# Patient Record
Sex: MALE | Race: WHITE | Employment: UNEMPLOYED | URBAN - METROPOLITAN AREA
[De-identification: names, ages, dates, MRNs, and addresses within clinical notes are randomized per-mention and may not be internally consistent; named-entity substitution may affect disease eponyms.]

---

## 2017-01-02 ENCOUNTER — HOSPITAL ENCOUNTER (EMERGENCY)
Age: 27
Discharge: HOME | End: 2017-01-02
Attending: EMERGENCY MEDICINE
Payer: MEDICAID

## 2017-01-02 VITALS
BODY MASS INDEX: 28.89 KG/M2 | HEIGHT: 67 IN | DIASTOLIC BLOOD PRESSURE: 64 MMHG | WEIGHT: 184.08 LBS | OXYGEN SATURATION: 100 % | SYSTOLIC BLOOD PRESSURE: 116 MMHG | HEART RATE: 74 BPM | RESPIRATION RATE: 16 BRPM | TEMPERATURE: 98.4 F

## 2017-01-02 DIAGNOSIS — Z72.0 TOBACCO ABUSE: ICD-10-CM

## 2017-01-02 DIAGNOSIS — R10.9 CHRONIC ABDOMINAL PAIN: ICD-10-CM

## 2017-01-02 DIAGNOSIS — R11.2 NAUSEA AND VOMITING, INTRACTABILITY OF VOMITING NOT SPECIFIED, UNSPECIFIED VOMITING TYPE: Primary | ICD-10-CM

## 2017-01-02 DIAGNOSIS — G89.29 CHRONIC ABDOMINAL PAIN: ICD-10-CM

## 2017-01-02 LAB
ALBUMIN SERPL BCP-MCNC: 3.9 G/DL (ref 3.5–5)
ALBUMIN/GLOB SERPL: 1 {RATIO} (ref 1.1–2.2)
ALP SERPL-CCNC: 77 U/L (ref 45–117)
ALT SERPL-CCNC: 45 U/L (ref 12–78)
AMPHET UR QL SCN: NEGATIVE
ANION GAP BLD CALC-SCNC: 7 MMOL/L (ref 5–15)
APPEARANCE UR: CLEAR
AST SERPL W P-5'-P-CCNC: 27 U/L (ref 15–37)
BARBITURATES UR QL SCN: NEGATIVE
BASOPHILS # BLD AUTO: 0 K/UL (ref 0–0.1)
BASOPHILS # BLD: 0 % (ref 0–1)
BENZODIAZ UR QL: POSITIVE
BILIRUB SERPL-MCNC: 0.2 MG/DL (ref 0.2–1)
BILIRUB UR QL: NEGATIVE
BUN SERPL-MCNC: 18 MG/DL (ref 6–20)
BUN/CREAT SERPL: 19 (ref 12–20)
CALCIUM SERPL-MCNC: 9 MG/DL (ref 8.5–10.1)
CANNABINOIDS UR QL SCN: POSITIVE
CHLORIDE SERPL-SCNC: 105 MMOL/L (ref 97–108)
CO2 SERPL-SCNC: 27 MMOL/L (ref 21–32)
COCAINE UR QL SCN: NEGATIVE
COLOR UR: NORMAL
CREAT SERPL-MCNC: 0.96 MG/DL (ref 0.7–1.3)
DRUG SCRN COMMENT,DRGCM: ABNORMAL
EOSINOPHIL # BLD: 0.2 K/UL (ref 0–0.4)
EOSINOPHIL NFR BLD: 3 % (ref 0–7)
ERYTHROCYTE [DISTWIDTH] IN BLOOD BY AUTOMATED COUNT: 12.8 % (ref 11.5–14.5)
GLOBULIN SER CALC-MCNC: 3.9 G/DL (ref 2–4)
GLUCOSE SERPL-MCNC: 102 MG/DL (ref 65–100)
GLUCOSE UR STRIP.AUTO-MCNC: NEGATIVE MG/DL
HCT VFR BLD AUTO: 42.9 % (ref 36.6–50.3)
HGB BLD-MCNC: 14.8 G/DL (ref 12.1–17)
HGB UR QL STRIP: NEGATIVE
KETONES UR QL STRIP.AUTO: NEGATIVE MG/DL
LEUKOCYTE ESTERASE UR QL STRIP.AUTO: NEGATIVE
LYMPHOCYTES # BLD AUTO: 39 % (ref 12–49)
LYMPHOCYTES # BLD: 2.3 K/UL (ref 0.8–3.5)
MCH RBC QN AUTO: 31.1 PG (ref 26–34)
MCHC RBC AUTO-ENTMCNC: 34.5 G/DL (ref 30–36.5)
MCV RBC AUTO: 90.1 FL (ref 80–99)
METHADONE UR QL: NEGATIVE
MONOCYTES # BLD: 0.6 K/UL (ref 0–1)
MONOCYTES NFR BLD AUTO: 9 % (ref 5–13)
NEUTS SEG # BLD: 3 K/UL (ref 1.8–8)
NEUTS SEG NFR BLD AUTO: 49 % (ref 32–75)
NITRITE UR QL STRIP.AUTO: NEGATIVE
OPIATES UR QL: NEGATIVE
PCP UR QL: NEGATIVE
PH UR STRIP: 6.5 [PH] (ref 5–8)
PLATELET # BLD AUTO: 184 K/UL (ref 150–400)
POTASSIUM SERPL-SCNC: 4.3 MMOL/L (ref 3.5–5.1)
PROT SERPL-MCNC: 7.8 G/DL (ref 6.4–8.2)
PROT UR STRIP-MCNC: NEGATIVE MG/DL
RBC # BLD AUTO: 4.76 M/UL (ref 4.1–5.7)
SODIUM SERPL-SCNC: 139 MMOL/L (ref 136–145)
SP GR UR REFRACTOMETRY: 1.01 (ref 1–1.03)
UROBILINOGEN UR QL STRIP.AUTO: 0.2 EU/DL (ref 0.2–1)
WBC # BLD AUTO: 6.1 K/UL (ref 4.1–11.1)

## 2017-01-02 PROCEDURE — 81003 URINALYSIS AUTO W/O SCOPE: CPT | Performed by: EMERGENCY MEDICINE

## 2017-01-02 PROCEDURE — 85025 COMPLETE CBC W/AUTO DIFF WBC: CPT | Performed by: EMERGENCY MEDICINE

## 2017-01-02 PROCEDURE — 36415 COLL VENOUS BLD VENIPUNCTURE: CPT | Performed by: EMERGENCY MEDICINE

## 2017-01-02 PROCEDURE — 80053 COMPREHEN METABOLIC PANEL: CPT | Performed by: EMERGENCY MEDICINE

## 2017-01-02 PROCEDURE — 80307 DRUG TEST PRSMV CHEM ANLYZR: CPT | Performed by: PHYSICIAN ASSISTANT

## 2017-01-02 PROCEDURE — 74011250637 HC RX REV CODE- 250/637: Performed by: PHYSICIAN ASSISTANT

## 2017-01-02 PROCEDURE — 99283 EMERGENCY DEPT VISIT LOW MDM: CPT

## 2017-01-02 RX ORDER — ONDANSETRON 4 MG/1
8 TABLET, ORALLY DISINTEGRATING ORAL
Status: COMPLETED | OUTPATIENT
Start: 2017-01-02 | End: 2017-01-02

## 2017-01-02 RX ORDER — OXYCODONE HYDROCHLORIDE 5 MG/1
5 TABLET ORAL
Status: COMPLETED | OUTPATIENT
Start: 2017-01-02 | End: 2017-01-02

## 2017-01-02 RX ORDER — PROMETHAZINE HYDROCHLORIDE 25 MG/1
25 TABLET ORAL
Qty: 20 TAB | Refills: 0 | Status: SHIPPED | OUTPATIENT
Start: 2017-01-02 | End: 2017-01-07

## 2017-01-02 RX ORDER — ONDANSETRON 4 MG/1
4 TABLET, ORALLY DISINTEGRATING ORAL
Qty: 20 TAB | Refills: 0 | Status: SHIPPED | OUTPATIENT
Start: 2017-01-02 | End: 2017-01-07

## 2017-01-02 RX ADMIN — ONDANSETRON 8 MG: 4 TABLET, ORALLY DISINTEGRATING ORAL at 13:59

## 2017-01-02 RX ADMIN — OXYCODONE HYDROCHLORIDE 5 MG: 5 TABLET ORAL at 16:13

## 2017-01-02 NOTE — ED PROVIDER NOTES
HPI Comments: Navarro Dillon is a 32 y.o. male with hx significant for GERD and asthma who presents ambulatory to the ED returning for the 7th time in the last 4 weeks for cc abdominal pain, nausea, and vomiting which started suddenly yesterday. Pt is uncertain if he has had a fever. Pt reports his last meal was yesterday which consisted of greasy foods. Pt reports that may be the cause of his abdominal pain. Pt reports several episodes of nonbilious/nonbloody emesis. PCP: Preston Hogan NP     Social Hx: + smoking (1ppd), - alcohol, + illicit drugs (Heroin, Marijuana)      There are no other complaints, changes, or physical findings at this time. The history is provided by the patient. Past Medical History:   Diagnosis Date    Asthma     Depression     Gastrointestinal disorder      abdominal pain    Gastroparesis     GERD (gastroesophageal reflux disease)     Heroin abuse     History of IBS      saw Dr. aMdelyn Padron Ill-defined condition      ADHD    Psychiatric disorder      Depression    Seizures [facility])      age 9 y/o - had sz x 2 - was seen by Dr. Ramirez Marshall - another sz age 15 y/o, another agoe 15 y/o       Past Surgical History:   Procedure Laterality Date    Hx orthopaedic       right arm fx.  Upper gi endoscopy,biopsy  6/2/2015          Hx other surgical  6/2/15     ESOPHAGOGASTRODUODENAL (EGD) BIOPSY         Family History:   Problem Relation Age of Onset    Anxiety Mother     Anxiety Father     Cancer Maternal Grandfather      bladder     Heart Disease Maternal Grandfather     Diabetes Maternal Grandfather        Social History     Social History    Marital status:      Spouse name: N/A    Number of children: N/A    Years of education: N/A     Occupational History    Not on file.      Social History Main Topics    Smoking status: Current Every Day Smoker     Packs/day: 1.00     Types: Cigarettes    Smokeless tobacco: Never Used    Alcohol use No      Comment: 6 months     Drug use: Yes     Special: Marijuana, Heroin      Comment: in partial remission    Sexual activity: Yes     Partners: Female     Birth control/ protection: None      Comment: patient has been at [facility]  / relapsed on  a few months ago/ patient was also on methadone      Other Topics Concern    Not on file     Social History Narrative         ALLERGIES: Amoxicillin; Ceclor [cefaclor]; Demerol [meperidine]; Levaquin [levofloxacin]; Lorazepam; Morphine; Tramadol; and Zoloft [sertraline]    Review of Systems   Constitutional: Negative for fatigue and fever. HENT: Negative for congestion, ear pain and rhinorrhea. Eyes: Negative for pain and redness. Respiratory: Negative for cough and wheezing. Cardiovascular: Negative for chest pain and palpitations. Gastrointestinal: Positive for abdominal pain, nausea and vomiting. Negative for blood in stool and diarrhea. Genitourinary: Negative for dysuria, frequency and urgency. Musculoskeletal: Negative for back pain, neck pain and neck stiffness. Skin: Negative for rash and wound. Neurological: Negative for weakness, light-headedness, numbness and headaches. Vitals:    01/02/17 1204   BP: 117/69   Pulse: 81   Resp: 18   Temp: 98.4 °F (36.9 °C)   SpO2: 100%   Weight: 83.5 kg (184 lb 1.4 oz)   Height: 5' 7\" (1.702 m)            Physical Exam   Constitutional: He is oriented to person, place, and time. He appears well-developed and well-nourished. No distress. HENT:   Head: Normocephalic and atraumatic. Right Ear: External ear normal.   Left Ear: External ear normal.   Eyes: Conjunctivae and EOM are normal. Pupils are equal, round, and reactive to light. Neck: Normal range of motion. Neck supple. Cardiovascular: Normal rate, regular rhythm and normal heart sounds. Pulmonary/Chest: Effort normal and breath sounds normal. No respiratory distress. Abdominal: Soft. Bowel sounds are normal. He exhibits no mass.  There is tenderness. There is no rebound and no guarding. Flat, Diffuse tenderness  Normal bowel sounds   Neurological: He is alert and oriented to person, place, and time. Skin: Skin is warm and dry. Psychiatric: He has a normal mood and affect. Nursing note and vitals reviewed. MDM  Number of Diagnoses or Management Options  Diagnosis management comments:     Afebrile; medical records reviewed;  reviewed; this is the 7th ED visit for this complaint since DEC01 2016. Previous imaging reviewed. Vital signs are normal today. Labs are normal and non diagnostic. UA is not positive for opioids. Tolerating liquids in the ED with no episode of emesis or diarrhea. Additional testing deferred. Given the number and frequency of narcotic prescriptions I am unable to prescribe additional narcotic pain medicine today. I will offer a single dose here in the ED. Amount and/or Complexity of Data Reviewed  Clinical lab tests: ordered and reviewed  Review and summarize past medical records: yes    Patient Progress  Patient progress: stable    ED Course       Procedures      TOBACCO COUNSELING:  Upon evaluation, pt expressed that they are a current tobacco user. Pt has been counseled on the dangers of smoking and was encouraged to quit as soon as possible in order to decrease further risks to their health. Pt has conveyed their understanding of the risks involved should they continue to use tobacco products.       LABORATORY TESTS:  Recent Results (from the past 12 hour(s))   CBC WITH AUTOMATED DIFF    Collection Time: 01/02/17 12:15 PM   Result Value Ref Range    WBC 6.1 4.1 - 11.1 K/uL    RBC 4.76 4.10 - 5.70 M/uL    HGB 14.8 12.1 - 17.0 g/dL    HCT 42.9 36.6 - 50.3 %    MCV 90.1 80.0 - 99.0 FL    MCH 31.1 26.0 - 34.0 PG    MCHC 34.5 30.0 - 36.5 g/dL    RDW 12.8 11.5 - 14.5 %    PLATELET 662 838 - 383 K/uL    NEUTROPHILS 49 32 - 75 %    LYMPHOCYTES 39 12 - 49 %    MONOCYTES 9 5 - 13 %    EOSINOPHILS 3 0 - 7 % BASOPHILS 0 0 - 1 %    ABS. NEUTROPHILS 3.0 1.8 - 8.0 K/UL    ABS. LYMPHOCYTES 2.3 0.8 - 3.5 K/UL    ABS. MONOCYTES 0.6 0.0 - 1.0 K/UL    ABS. EOSINOPHILS 0.2 0.0 - 0.4 K/UL    ABS. BASOPHILS 0.0 0.0 - 0.1 K/UL   METABOLIC PANEL, COMPREHENSIVE    Collection Time: 01/02/17 12:15 PM   Result Value Ref Range    Sodium 139 136 - 145 mmol/L    Potassium 4.3 3.5 - 5.1 mmol/L    Chloride 105 97 - 108 mmol/L    CO2 27 21 - 32 mmol/L    Anion gap 7 5 - 15 mmol/L    Glucose 102 (H) 65 - 100 mg/dL    BUN 18 6 - 20 MG/DL    Creatinine 0.96 0.70 - 1.30 MG/DL    BUN/Creatinine ratio 19 12 - 20      GFR est AA >60 >60 ml/min/1.73m2    GFR est non-AA >60 >60 ml/min/1.73m2    Calcium 9.0 8.5 - 10.1 MG/DL    Bilirubin, total 0.2 0.2 - 1.0 MG/DL    ALT 45 12 - 78 U/L    AST 27 15 - 37 U/L    Alk.  phosphatase 77 45 - 117 U/L    Protein, total 7.8 6.4 - 8.2 g/dL    Albumin 3.9 3.5 - 5.0 g/dL    Globulin 3.9 2.0 - 4.0 g/dL    A-G Ratio 1.0 (L) 1.1 - 2.2     URINALYSIS W/ RFLX MICROSCOPIC    Collection Time: 01/02/17  2:56 PM   Result Value Ref Range    Color YELLOW/STRAW      Appearance CLEAR CLEAR      Specific gravity 1.015 1.003 - 1.030      pH (UA) 6.5 5.0 - 8.0      Protein NEGATIVE  NEG mg/dL    Glucose NEGATIVE  NEG mg/dL    Ketone NEGATIVE  NEG mg/dL    Bilirubin NEGATIVE  NEG      Blood NEGATIVE  NEG      Urobilinogen 0.2 0.2 - 1.0 EU/dL    Nitrites NEGATIVE  NEG      Leukocyte Esterase NEGATIVE  NEG     DRUG SCREEN, URINE    Collection Time: 01/02/17  2:56 PM   Result Value Ref Range    AMPHETAMINE NEGATIVE  NEG      BARBITURATES NEGATIVE  NEG      BENZODIAZEPINE POSITIVE (A) NEG      COCAINE NEGATIVE  NEG      METHADONE NEGATIVE  NEG      OPIATES NEGATIVE  NEG      PCP(PHENCYCLIDINE) NEGATIVE  NEG      THC (TH-CANNABINOL) POSITIVE (A) NEG      Drug screen comment (NOTE)        MEDICATIONS GIVEN:  Medications   oxyCODONE IR (ROXICODONE) tablet 5 mg (not administered)   ondansetron (ZOFRAN ODT) tablet 8 mg (8 mg Oral Given 1/2/17 6888)       IMPRESSION:  1. Nausea and vomiting, intractability of vomiting not specified, unspecified vomiting type    2. Chronic abdominal pain    3. Tobacco abuse        PLAN:  1. Current Discharge Medication List      START taking these medications    Details   ! ! promethazine (PHENERGAN) 25 mg tablet Take 1 Tab by mouth every six (6) hours as needed for Nausea. Qty: 20 Tab, Refills: 0      !! ondansetron (ZOFRAN ODT) 4 mg disintegrating tablet Take 1 Tab by mouth every eight (8) hours as needed for Nausea. Qty: 20 Tab, Refills: 0       !! - Potential duplicate medications found. Please discuss with provider. CONTINUE these medications which have NOT CHANGED    Details   !! metoclopramide HCl (REGLAN) 10 mg tablet Take 1 Tab by mouth every six (6) hours as needed for Nausea for up to 10 days. Qty: 12 Tab, Refills: 0      HYDROcodone-acetaminophen (NORCO) 5-325 mg per tablet Take 1 Tab by mouth every four (4) hours as needed for Pain. Max Daily Amount: 6 Tabs. Qty: 12 Tab, Refills: 0      !! ondansetron (ZOFRAN ODT) 4 mg disintegrating tablet Take 1 Tab by mouth every eight (8) hours as needed for Nausea. Qty: 10 Tab, Refills: 0      diphenoxylate-atropine (LOMOTIL) 2.5-0.025 mg per tablet Take 1 Tab by mouth four (4) times daily as needed for Diarrhea (1 tab after each stool for max 8 per day). Max Daily Amount: 4 Tabs. Take after each stool for a maximum of 8 tablets daily  Qty: 20 Tab, Refills: 0      oxyCODONE IR (ROXICODONE) 5 mg immediate release tablet Take 1 Tab by mouth every six (6) hours as needed for Pain (breakthrough pain). Max Daily Amount: 20 mg. Indications: causes drowsiness;do not drink,drive, or use machinery while taking; take with stool softener  Qty: 12 Tab, Refills: 0      polyethylene glycol (MIRALAX) 17 gram/dose powder Take 17 g by mouth daily.  1 tablespoon with 8 oz of water daily  Qty: 510 g, Refills: 0      clonazePAM (KLONOPIN) 1 mg tablet Take 1 Tab by mouth two (2) times a day. Max Daily Amount: 2 mg. Qty: 20 Tab, Refills: 0      promethazine (PHENERGAN) 25 mg suppository Insert 1 Suppository into rectum every six (6) hours as needed for Nausea for up to 25 doses. Qty: 25 Suppository, Refills: 0      !! metoclopramide HCl (REGLAN) 10 mg tablet Take 1 Tab by mouth every six (6) hours as needed. Qty: 20 Tab, Refills: 0      !! promethazine (PHENERGAN) 25 mg tablet Take 1 Tab by mouth every six (6) hours as needed. Qty: 12 Tab, Refills: 0      docusate sodium 100 mg tab Take 1 Tab by mouth two (2) times a day. Indications: Constipation  Qty: 20 Tab, Refills: 0      PARoxetine CR (PAXIL-CR) 25 mg tablet Take 1 Tab by mouth daily. Qty: 30 Tab, Refills: 1    Associated Diagnoses: Depression with anxiety       ! ! - Potential duplicate medications found. Please discuss with provider. 2.   Follow-up Information     Follow up With Details Comments Contact Info    Jeri Briones NP Schedule an appointment as soon as possible for a visit PRIMARY CARE: call to schedule follow up [address] [facility]  [address] [facility]      Ev Arias MD Schedule an appointment as soon as possible for a visit GASTROENTEROLOGY: call to schedule follow up [address] [place] [address] Rd  [phone]          Return to ED if worse     DISCHARGE NOTE  4:07 PM  The patient has been re-evaluated and is ready for discharge. Reviewed available results with patient. Counseled pt on diagnosis and care plan. Pt has expressed understanding, and all questions have been answered. Pt agrees with plan and agrees to F/U as recommended, or return to the ED if their sxs worsen. Discharge instructions have been provided and explained to the pt, along with reasons to return to the ED. This note is prepared by Carlos Oden and Kimi acting as Scribes for Sahra Berg. ASHLEY Francisco:  The scribes' documentation has been prepared under my direction and personally reviewed by me in its entirety. I confirm that the note above accurately reflects all work, treatment, procedures, and medical decision making performed by me.

## 2017-01-02 NOTE — DISCHARGE INSTRUCTIONS
Thank you for allowing us to provide you with care today. We hope we addressed all of your concerns and needs. We strive to provide excellent quality care in the Emergency Department. Please rate us as excellent, as anything less than excellent does not meet our expectations. If you feel that you have not received excellent quality care or timely care, please ask to speak to the nurse manager. Please choose us in the future for your continued health care needs. The exam and treatment you received in the Emergency Department were for an urgent problem and are not intended as complete care. It is important that you follow-up with a doctor, nurse practitioner, or  [id number] assistant to: (1) confirm your diagnosis, (2) re-evaluation of changes in your illness and treatment, and (3) for ongoing care. If your symptoms become worse or you do not improve as expected and you are unable to reach your usual health care provider, you should return to the Emergency Department. We are available 24 hours a day. Take this sheet with you when you go to your follow-up visit. If you have any problem arranging the follow-up visit, contact the Emergency Department immediately. Make an appointment with your Primary Care doctor for follow up of this visit. Return to the ER if you are unable to be seen in the time recommended on your discharge instructions.

## 2017-01-02 NOTE — ED NOTES
Patient reports n/v for the past 1 day.  Patient reports this feeling similar to previous episodes of gastroparesis

## 2017-01-02 NOTE — ED NOTES
Discharge instructions given to patient by Radha MCDONOUGH. Patient verbalized understanding of discharge instructions. Pt discharged without difficulty. Pt. Discharged in stable condition via ambulation , accompanied by wife.

## 2017-01-06 ENCOUNTER — HOSPITAL ENCOUNTER (EMERGENCY)
Age: 27
Discharge: HOME | End: 2017-01-06
Attending: EMERGENCY MEDICINE | Admitting: EMERGENCY MEDICINE
Payer: MEDICAID

## 2017-01-06 VITALS
WEIGHT: 170 LBS | OXYGEN SATURATION: 100 % | HEART RATE: 83 BPM | RESPIRATION RATE: 17 BRPM | BODY MASS INDEX: 26.68 KG/M2 | SYSTOLIC BLOOD PRESSURE: 120 MMHG | DIASTOLIC BLOOD PRESSURE: 53 MMHG | HEIGHT: 67 IN | TEMPERATURE: 98.8 F

## 2017-01-06 DIAGNOSIS — R56.9 SEIZURE (HCC): Primary | ICD-10-CM

## 2017-01-06 LAB
ALBUMIN SERPL BCP-MCNC: 4 G/DL (ref 3.5–5)
ALBUMIN/GLOB SERPL: 1.1 {RATIO} (ref 1.1–2.2)
ALP SERPL-CCNC: 72 U/L (ref 45–117)
ALT SERPL-CCNC: 30 U/L (ref 12–78)
AMPHET UR QL SCN: NEGATIVE
ANION GAP BLD CALC-SCNC: 8 MMOL/L (ref 5–15)
APPEARANCE UR: CLEAR
AST SERPL W P-5'-P-CCNC: 16 U/L (ref 15–37)
BACTERIA URNS QL MICRO: NEGATIVE /HPF
BARBITURATES UR QL SCN: NEGATIVE
BASOPHILS # BLD AUTO: 0 K/UL (ref 0–0.1)
BASOPHILS # BLD: 0 % (ref 0–1)
BENZODIAZ UR QL: NEGATIVE
BILIRUB SERPL-MCNC: 0.3 MG/DL (ref 0.2–1)
BILIRUB UR QL: NEGATIVE
BUN SERPL-MCNC: 20 MG/DL (ref 6–20)
BUN/CREAT SERPL: 21 (ref 12–20)
CALCIUM SERPL-MCNC: 9 MG/DL (ref 8.5–10.1)
CANNABINOIDS UR QL SCN: POSITIVE
CHLORIDE SERPL-SCNC: 105 MMOL/L (ref 97–108)
CO2 SERPL-SCNC: 27 MMOL/L (ref 21–32)
COCAINE UR QL SCN: NEGATIVE
COLOR UR: ABNORMAL
CREAT SERPL-MCNC: 0.94 MG/DL (ref 0.7–1.3)
DRUG SCRN COMMENT,DRGCM: ABNORMAL
EOSINOPHIL # BLD: 0.1 K/UL (ref 0–0.4)
EOSINOPHIL NFR BLD: 1 % (ref 0–7)
EPITH CASTS URNS QL MICRO: ABNORMAL /LPF
ERYTHROCYTE [DISTWIDTH] IN BLOOD BY AUTOMATED COUNT: 12.6 % (ref 11.5–14.5)
GLOBULIN SER CALC-MCNC: 3.6 G/DL (ref 2–4)
GLUCOSE SERPL-MCNC: 92 MG/DL (ref 65–100)
GLUCOSE UR STRIP.AUTO-MCNC: NEGATIVE MG/DL
HCT VFR BLD AUTO: 41.6 % (ref 36.6–50.3)
HGB BLD-MCNC: 14.7 G/DL (ref 12.1–17)
HGB UR QL STRIP: NEGATIVE
KETONES UR QL STRIP.AUTO: NEGATIVE MG/DL
LEUKOCYTE ESTERASE UR QL STRIP.AUTO: ABNORMAL
LYMPHOCYTES # BLD AUTO: 28 % (ref 12–49)
LYMPHOCYTES # BLD: 2.4 K/UL (ref 0.8–3.5)
MCH RBC QN AUTO: 30.7 PG (ref 26–34)
MCHC RBC AUTO-ENTMCNC: 35.3 G/DL (ref 30–36.5)
MCV RBC AUTO: 86.8 FL (ref 80–99)
METHADONE UR QL: NEGATIVE
MONOCYTES # BLD: 0.5 K/UL (ref 0–1)
MONOCYTES NFR BLD AUTO: 6 % (ref 5–13)
MUCOUS THREADS URNS QL MICRO: ABNORMAL /LPF
NEUTS SEG # BLD: 5.4 K/UL (ref 1.8–8)
NEUTS SEG NFR BLD AUTO: 65 % (ref 32–75)
NITRITE UR QL STRIP.AUTO: NEGATIVE
OPIATES UR QL: NEGATIVE
PCP UR QL: NEGATIVE
PH UR STRIP: 5 [PH] (ref 5–8)
PLATELET # BLD AUTO: 211 K/UL (ref 150–400)
POTASSIUM SERPL-SCNC: 4.4 MMOL/L (ref 3.5–5.1)
PROT SERPL-MCNC: 7.6 G/DL (ref 6.4–8.2)
PROT UR STRIP-MCNC: NEGATIVE MG/DL
RBC # BLD AUTO: 4.79 M/UL (ref 4.1–5.7)
RBC #/AREA URNS HPF: ABNORMAL /HPF (ref 0–5)
SODIUM SERPL-SCNC: 140 MMOL/L (ref 136–145)
SP GR UR REFRACTOMETRY: 1.02 (ref 1–1.03)
UA: UC IF INDICATED,UAUC: ABNORMAL
UROBILINOGEN UR QL STRIP.AUTO: 0.2 EU/DL (ref 0.2–1)
WBC # BLD AUTO: 8.4 K/UL (ref 4.1–11.1)
WBC URNS QL MICRO: ABNORMAL /HPF (ref 0–4)

## 2017-01-06 PROCEDURE — 81001 URINALYSIS AUTO W/SCOPE: CPT | Performed by: EMERGENCY MEDICINE

## 2017-01-06 PROCEDURE — 36415 COLL VENOUS BLD VENIPUNCTURE: CPT | Performed by: EMERGENCY MEDICINE

## 2017-01-06 PROCEDURE — 96374 THER/PROPH/DIAG INJ IV PUSH: CPT

## 2017-01-06 PROCEDURE — 74011250637 HC RX REV CODE- 250/637: Performed by: EMERGENCY MEDICINE

## 2017-01-06 PROCEDURE — 74011250636 HC RX REV CODE- 250/636: Performed by: EMERGENCY MEDICINE

## 2017-01-06 PROCEDURE — 80053 COMPREHEN METABOLIC PANEL: CPT | Performed by: EMERGENCY MEDICINE

## 2017-01-06 PROCEDURE — 80307 DRUG TEST PRSMV CHEM ANLYZR: CPT | Performed by: EMERGENCY MEDICINE

## 2017-01-06 PROCEDURE — 85025 COMPLETE CBC W/AUTO DIFF WBC: CPT | Performed by: EMERGENCY MEDICINE

## 2017-01-06 PROCEDURE — 99285 EMERGENCY DEPT VISIT HI MDM: CPT

## 2017-01-06 RX ORDER — LEVETIRACETAM 500 MG/1
500 TABLET ORAL
Status: COMPLETED | OUTPATIENT
Start: 2017-01-06 | End: 2017-01-06

## 2017-01-06 RX ORDER — LEVETIRACETAM 500 MG/1
500 TABLET ORAL 2 TIMES DAILY
Qty: 60 TAB | Refills: 0 | Status: SHIPPED | OUTPATIENT
Start: 2017-01-06 | End: 2017-03-30

## 2017-01-06 RX ORDER — BUTALBITAL, ACETAMINOPHEN AND CAFFEINE 50; 325; 40 MG/1; MG/1; MG/1
2 TABLET ORAL
Status: COMPLETED | OUTPATIENT
Start: 2017-01-06 | End: 2017-01-06

## 2017-01-06 RX ORDER — BUTALBITAL, ACETAMINOPHEN AND CAFFEINE 300; 40; 50 MG/1; MG/1; MG/1
2 CAPSULE ORAL
Qty: 20 CAP | Refills: 0 | Status: SHIPPED | OUTPATIENT
Start: 2017-01-06 | End: 2017-03-30

## 2017-01-06 RX ORDER — MIDAZOLAM HYDROCHLORIDE 1 MG/ML
5 INJECTION, SOLUTION INTRAMUSCULAR; INTRAVENOUS
Status: COMPLETED | OUTPATIENT
Start: 2017-01-06 | End: 2017-01-06

## 2017-01-06 RX ADMIN — BUTALBITAL, ACETAMINOPHEN AND CAFFEINE 2 TABLET: 50; 325; 40 TABLET ORAL at 14:06

## 2017-01-06 RX ADMIN — MIDAZOLAM HYDROCHLORIDE 5 MG: 1 INJECTION, SOLUTION INTRAMUSCULAR; INTRAVENOUS at 14:06

## 2017-01-06 RX ADMIN — LEVETIRACETAM 500 MG: 500 TABLET ORAL at 15:15

## 2017-01-06 NOTE — ED NOTES
Discharge instructions provided to patient by PA/MD. VSS. Patient refuses wheelchair and ambulatroy to discharge with steady gait.

## 2017-01-06 NOTE — ED PROVIDER NOTES
HPI Comments: Abel Esparza is a 32 y.o. male with PMHx significant for asthma, depression, seizures, GERD, Gastroparesis, and Heroin abuse presenting via EMS to [id number] [place] [address] ED with c/o an 8/10 headache and tongue pain s/p suffering a seizure just prior to arrival in the ED. The pt reports that he was walking in [place] with his wife when all of a sudden he had a seizure for the first time in 10 years. He notes that he woke up from his seizure surrounded by EMS with a sharp headache and pain in his tongue from biting it. He states that he takes [place] everyday but notes that he had not taken any yet today. He reports that he used to take Depakote for his seizures as well but states that he has not taken any in a while. The pt's mother states that the pt has also been experiencing n/v for the past couple of weeks. He specifically denies any fevers, chills, nausea, vomiting, chest pain, shortness of breath, rash, diarrhea, sweating or weight loss. PCP: Juni Chacon NP  Social History:  (+) Tobacco (1 ppd),   (-) EtOH,      (+) Drugs     There are no other complaints, changes, or physical findings at this time. The history is provided by the patient and a parent. Past Medical History:   Diagnosis Date    Asthma     Depression     Gastrointestinal disorder      abdominal pain    Gastroparesis     GERD (gastroesophageal reflux disease)     Heroin abuse     History of IBS      saw Dr. Key Armstrong Ill-defined condition      ADHD    Psychiatric disorder      Depression    Seizures [facility])      age 9 y/o - had sz x 2 - was seen by Dr. Opal Klein - another sz age 15 y/o, another agoe 15 y/o       Past Surgical History:   Procedure Laterality Date    Hx orthopaedic       right arm fx.     Upper gi endoscopy,biopsy  6/2/2015          Hx other surgical  6/2/15     ESOPHAGOGASTRODUODENAL (EGD) BIOPSY         Family History:   Problem Relation Age of Onset    Anxiety Mother     Anxiety Father    [address] [facility] Cancer Maternal Grandfather      bladder     Heart Disease Maternal Grandfather     Diabetes Maternal Grandfather        Social History     Social History    Marital status:      Spouse name: N/A    Number of children: N/A    Years of education: N/A     Occupational History    Not on file. Social History Main Topics    Smoking status: Current Every Day Smoker     Packs/day: 1.00     Types: Cigarettes    Smokeless tobacco: Never Used    Alcohol use No      Comment: 6 months     Drug use: Yes     Special: Marijuana, Heroin      Comment: in partial remission    Sexual activity: Yes     Partners: Female     Birth control/ protection: None      Comment: patient has been at [facility]  / relapsed on  a few months ago/ patient was also on methadone      Other Topics Concern    Not on file     Social History Narrative         ALLERGIES: Amoxicillin; Ceclor [cefaclor]; Demerol [meperidine]; Levaquin [levofloxacin]; Lorazepam; Morphine; Tramadol; and Zoloft [sertraline]    Review of Systems   Constitutional: Negative. Negative for activity change, appetite change, chills, fatigue, fever and unexpected weight change. HENT: Negative for congestion, hearing loss, rhinorrhea, sneezing and voice change. Positive for tongue pain   Eyes: Negative. Negative for pain and visual disturbance. Respiratory: Negative. Negative for apnea, cough, choking, chest tightness and shortness of breath. Cardiovascular: Negative. Negative for chest pain and palpitations. Gastrointestinal: Negative for abdominal distention, abdominal pain, blood in stool and diarrhea. Genitourinary: Negative. Negative for difficulty urinating, flank pain, frequency and urgency. No discharge   Musculoskeletal: Negative. Negative for arthralgias, back pain, myalgias and neck stiffness. Skin: Negative. Negative for color change and rash. Neurological: Positive for seizures and headaches.  Negative for dizziness, syncope, speech difficulty, weakness and numbness. Hematological: Negative for adenopathy. Psychiatric/Behavioral: Negative. Negative for agitation, behavioral problems, dysphoric mood and suicidal ideas. The patient is not nervous/anxious. All other systems reviewed and are negative. Vitals:    01/06/17 1345 01/06/17 1430 01/06/17 1515 01/06/17 1530   BP: 126/78 115/64 112/61 120/53   Pulse: 88 89 83    Resp: 20 18 17    Temp:       SpO2: 98% 96% 97% 100%   Weight:       Height:                Physical Exam   Constitutional: He is oriented to person, place, and time. He appears well-developed and well-nourished. No distress. HENT:   Head: Normocephalic and atraumatic. Mouth/Throat: Oropharynx is clear and moist. No oropharyngeal exudate. Abrasion to right lateral tongue      Eyes: Conjunctivae and EOM are normal. Pupils are equal, round, and reactive to light. Right eye exhibits no discharge. Left eye exhibits no discharge. Neck: Normal range of motion. Neck supple. Cardiovascular: Normal rate, regular rhythm and intact distal pulses. Exam reveals no gallop and no friction rub. No murmur heard. Pulmonary/Chest: Effort normal and breath sounds normal. No respiratory distress. He has no wheezes. He has no rales. He exhibits no tenderness. Abdominal: Soft. Bowel sounds are normal. He exhibits no distension and no mass. There is no tenderness. There is no rebound and no guarding. Musculoskeletal: Normal range of motion. He exhibits no edema. Lymphadenopathy:     He has no cervical adenopathy. Neurological: He is alert and oriented to person, place, and time. No cranial nerve deficit. Coordination normal.   Skin: Skin is warm and dry. No rash noted. No erythema. Psychiatric: He has a normal mood and affect. Nursing note and vitals reviewed.        MDM  Number of Diagnoses or Management Options  Seizure [facility]):   Diagnosis management comments:   DDx: sub therapeutic AED, Benzodiazepine withdrawal        Amount and/or Complexity of Data Reviewed  Clinical lab tests: ordered and reviewed  Obtain history from someone other than the patient: yes (Pt's mother   )  Review and summarize past medical records: yes    Patient Progress  Patient progress: stable    ED Course       Procedures    Chief Complaint   Patient presents with    Seizure     Appeared tonic clonic according to bystanders. Lac to tongue. hx of seizures. Not taking medications       3:28 PM  The patients presenting problems have been discussed, and they are in agreement with the care plan formulated and outlined with them. I have encouraged them to ask questions as they arise throughout their visit. MEDICATIONS GIVEN:  Medications   midazolam (VERSED) injection 5 mg (5 mg IntraVENous Given 1/6/17 1406)   butalbital-acetaminophen-caffeine (FIORICET, ESGIC) -40 mg per tablet 2 Tab (2 Tabs Oral Given 1/6/17 1406)   levETIRAcetam (KEPPRA) tablet 500 mg (500 mg Oral Given 1/6/17 1515)       LABS REVIEWED:  Recent Results (from the past 24 hour(s))   CBC WITH AUTOMATED DIFF    Collection Time: 01/06/17  1:58 PM   Result Value Ref Range    WBC 8.4 4.1 - 11.1 K/uL    RBC 4.79 4.10 - 5.70 M/uL    HGB 14.7 12.1 - 17.0 g/dL    HCT 41.6 36.6 - 50.3 %    MCV 86.8 80.0 - 99.0 FL    MCH 30.7 26.0 - 34.0 PG    MCHC 35.3 30.0 - 36.5 g/dL    RDW 12.6 11.5 - 14.5 %    PLATELET 679 490 - 516 K/uL    NEUTROPHILS 65 32 - 75 %    LYMPHOCYTES 28 12 - 49 %    MONOCYTES 6 5 - 13 %    EOSINOPHILS 1 0 - 7 %    BASOPHILS 0 0 - 1 %    ABS. NEUTROPHILS 5.4 1.8 - 8.0 K/UL    ABS. LYMPHOCYTES 2.4 0.8 - 3.5 K/UL    ABS. MONOCYTES 0.5 0.0 - 1.0 K/UL    ABS. EOSINOPHILS 0.1 0.0 - 0.4 K/UL    ABS.  BASOPHILS 0.0 0.0 - 0.1 K/UL   METABOLIC PANEL, COMPREHENSIVE    Collection Time: 01/06/17  1:58 PM   Result Value Ref Range    Sodium 140 136 - 145 mmol/L    Potassium 4.4 3.5 - 5.1 mmol/L    Chloride 105 97 - 108 mmol/L    CO2 27 21 - 32 mmol/L    Anion gap 8 5 - 15 mmol/L    Glucose 92 65 - 100 mg/dL    BUN 20 6 - 20 MG/DL    Creatinine 0.94 0.70 - 1.30 MG/DL    BUN/Creatinine ratio 21 (H) 12 - 20      GFR est AA >60 >60 ml/min/1.73m2    GFR est non-AA >60 >60 ml/min/1.73m2    Calcium 9.0 8.5 - 10.1 MG/DL    Bilirubin, total 0.3 0.2 - 1.0 MG/DL    ALT 30 12 - 78 U/L    AST 16 15 - 37 U/L    Alk.  phosphatase 72 45 - 117 U/L    Protein, total 7.6 6.4 - 8.2 g/dL    Albumin 4.0 3.5 - 5.0 g/dL    Globulin 3.6 2.0 - 4.0 g/dL    A-G Ratio 1.1 1.1 - 2.2     URINALYSIS W/ REFLEX CULTURE    Collection Time: 01/06/17  1:58 PM   Result Value Ref Range    Color YELLOW/STRAW      Appearance CLEAR CLEAR      Specific gravity 1.018 1.003 - 1.030      pH (UA) 5.0 5.0 - 8.0      Protein NEGATIVE  NEG mg/dL    Glucose NEGATIVE  NEG mg/dL    Ketone NEGATIVE  NEG mg/dL    Bilirubin NEGATIVE  NEG      Blood NEGATIVE  NEG      Urobilinogen 0.2 0.2 - 1.0 EU/dL    Nitrites NEGATIVE  NEG      Leukocyte Esterase SMALL (A) NEG      WBC 0-4 0 - 4 /hpf    RBC 0-5 0 - 5 /hpf    Epithelial cells FEW FEW /lpf    Bacteria NEGATIVE  NEG /hpf    UA:UC IF INDICATED CULTURE NOT INDICATED BY UA RESULT CNI      Mucus TRACE (A) NEG /lpf   DRUG SCREEN, URINE    Collection Time: 01/06/17  1:58 PM   Result Value Ref Range    AMPHETAMINE NEGATIVE  NEG      BARBITURATES NEGATIVE  NEG      BENZODIAZEPINE NEGATIVE  NEG      COCAINE NEGATIVE  NEG      METHADONE NEGATIVE  NEG      OPIATES NEGATIVE  NEG      PCP(PHENCYCLIDINE) NEGATIVE  NEG      THC (TH-CANNABINOL) POSITIVE (A) NEG      Drug screen comment (NOTE)        VITAL SIGNS:  Patient Vitals for the past 12 hrs:   Temp Pulse Resp BP SpO2   01/06/17 1530 - - - 120/53 100 %   01/06/17 1515 - 83 17 112/61 97 %   01/06/17 1430 - 89 18 115/64 96 %   01/06/17 1345 - 88 20 126/78 98 %   01/06/17 1344 98.8 °F (37.1 °C) 87 18 127/83 97 %       RADIOLOGY RESULTS:  The following have been ordered and reviewed:  No orders to display PROCEDURES:        CONSULTATIONS:       PROGRESS NOTES:  2:29 PM  Discussed cessation of tobacco and marijuana with the pt.     3:29 PM   The pt has been updated on his results and given follow up information for Dr. Chidi Mueller    DIAGNOSIS:    1. Seizure [facility])        PLAN:  Follow-up Information     Follow up With Details Comments [address] [facility], NP Call in 2 days  [address], [address]  [phone]          Current Discharge Medication List      START taking these medications    Details   levETIRAcetam (KEPPRA) 500 mg tablet Take 1 Tab by mouth two (2) times a day. Qty: 60 Tab, Refills: 0      butalbital-acetaminophen-caff (FIORICET) -40 mg per capsule Take 2 Caps by mouth every four (4) hours as needed for Pain. Max Daily Amount: 12 Caps. Qty: 20 Cap, Refills: 0         CONTINUE these medications which have NOT CHANGED    Details   ! ! promethazine (PHENERGAN) 25 mg tablet Take 1 Tab by mouth every six (6) hours as needed for Nausea. Qty: 20 Tab, Refills: 0      !! ondansetron (ZOFRAN ODT) 4 mg disintegrating tablet Take 1 Tab by mouth every eight (8) hours as needed for Nausea. Qty: 20 Tab, Refills: 0      !! metoclopramide HCl (REGLAN) 10 mg tablet Take 1 Tab by mouth every six (6) hours as needed for Nausea for up to 10 days. Qty: 12 Tab, Refills: 0      HYDROcodone-acetaminophen (NORCO) 5-325 mg per tablet Take 1 Tab by mouth every four (4) hours as needed for Pain. Max Daily Amount: 6 Tabs. Qty: 12 Tab, Refills: 0      !! ondansetron (ZOFRAN ODT) 4 mg disintegrating tablet Take 1 Tab by mouth every eight (8) hours as needed for Nausea. Qty: 10 Tab, Refills: 0      diphenoxylate-atropine (LOMOTIL) 2.5-0.025 mg per tablet Take 1 Tab by mouth four (4) times daily as needed for Diarrhea (1 tab after each stool for max 8 per day). Max Daily Amount: 4 Tabs.  Take after each stool for a maximum of 8 tablets daily  Qty: 20 Tab, Refills: 0      oxyCODONE IR (ROXICODONE) 5 mg immediate release tablet Take 1 Tab by mouth every six (6) hours as needed for Pain (breakthrough pain). Max Daily Amount: 20 mg. Indications: causes drowsiness;do not drink,drive, or use machinery while taking; take with stool softener  Qty: 12 Tab, Refills: 0      polyethylene glycol (MIRALAX) 17 gram/dose powder Take 17 g by mouth daily. 1 tablespoon with 8 oz of water daily  Qty: 510 g, Refills: 0      clonazePAM (KLONOPIN) 1 mg tablet Take 1 Tab by mouth two (2) times a day. Max Daily Amount: 2 mg. Qty: 20 Tab, Refills: 0      promethazine (PHENERGAN) 25 mg suppository Insert 1 Suppository into rectum every six (6) hours as needed for Nausea for up to 25 doses. Qty: 25 Suppository, Refills: 0      !! metoclopramide HCl (REGLAN) 10 mg tablet Take 1 Tab by mouth every six (6) hours as needed. Qty: 20 Tab, Refills: 0      !! promethazine (PHENERGAN) 25 mg tablet Take 1 Tab by mouth every six (6) hours as needed. Qty: 12 Tab, Refills: 0      docusate sodium 100 mg tab Take 1 Tab by mouth two (2) times a day. Indications: Constipation  Qty: 20 Tab, Refills: 0      PARoxetine CR (PAXIL-CR) 25 mg tablet Take 1 Tab by mouth daily. Qty: 30 Tab, Refills: 1    Associated Diagnoses: Depression with anxiety       ! ! - Potential duplicate medications found. Please discuss with provider. ED COURSE: The patients hospital course has been uncomplicated. 03:31 PM  Kobi Barrera Dougherty's  results have been reviewed with him. He has been counseled regarding his diagnosis. He verbally conveys understanding and agreement of the signs, symptoms, diagnosis, treatment and prognosis and additionally agrees to follow up as recommended with Dr. Yaritza Blanco, [place] in 24 - 48 hours. He also agrees with the care-plan and conveys that all of his questions have been answered.   I have also put together some discharge instructions for him that include: 1) educational information regarding their diagnosis, 2) how to care for their diagnosis at home, as well a 3) list of reasons why they would want to return to the ED prior to their follow-up appointment, should their condition change. This note is prepared by Ankit Cage, acting as Scribe for [employer] Dino Zimmer, [facility] Dino Zimmer MD: The scribe's documentation has been prepared under my direction and personally reviewed by me in its entirety. I confirm that the note above accurately reflects all work, treatment, procedures, and medical decision making performed by me.

## 2017-01-06 NOTE — DISCHARGE INSTRUCTIONS

## 2017-01-06 NOTE — ED NOTES
Assumed care of patient. Patient is alert and oriented, does not appear to be in distress. Patient ambulatory to ED with c/o witnessed seizure PTA while at the [facility]. Patient has hx of seizures but does not take medications for it. Patient admits to occasional marijuana use. Monitor x 3 applied. Rails padded. Lac to mouth present. Per EMS patient did not have post ictal state. Patient positioned for comfort with call bell within reach. Side rails up for safety. Provider to evaluate patient.

## 2017-01-07 ENCOUNTER — HOSPITAL ENCOUNTER (EMERGENCY)
Age: 27
Discharge: STILL ADMITTED OR ADMINISTRATIVE | End: 2017-01-07
Attending: EMERGENCY MEDICINE
Payer: MEDICAID

## 2017-01-07 ENCOUNTER — APPOINTMENT (OUTPATIENT)
Dept: GENERAL RADIOLOGY | Age: 27
End: 2017-01-07
Attending: EMERGENCY MEDICINE
Payer: MEDICAID

## 2017-01-07 ENCOUNTER — APPOINTMENT (OUTPATIENT)
Dept: CT IMAGING | Age: 27
End: 2017-01-07
Attending: EMERGENCY MEDICINE
Payer: MEDICAID

## 2017-01-07 ENCOUNTER — HOSPITAL ENCOUNTER (EMERGENCY)
Age: 27
Discharge: HOME | End: 2017-01-07
Attending: EMERGENCY MEDICINE
Payer: MEDICAID

## 2017-01-07 VITALS
DIASTOLIC BLOOD PRESSURE: 62 MMHG | OXYGEN SATURATION: 100 % | WEIGHT: 165 LBS | HEIGHT: 67 IN | RESPIRATION RATE: 14 BRPM | BODY MASS INDEX: 25.9 KG/M2 | HEART RATE: 95 BPM | SYSTOLIC BLOOD PRESSURE: 114 MMHG | TEMPERATURE: 98.2 F

## 2017-01-07 DIAGNOSIS — S00.83XA FACIAL CONTUSION, INITIAL ENCOUNTER: Primary | ICD-10-CM

## 2017-01-07 DIAGNOSIS — Z76.5 DRUG-SEEKING BEHAVIOR: ICD-10-CM

## 2017-01-07 DIAGNOSIS — Z76.5 MALINGERING: ICD-10-CM

## 2017-01-07 LAB
AMPHET UR QL SCN: NEGATIVE
APPEARANCE UR: CLEAR
BACTERIA URNS QL MICRO: NEGATIVE /HPF
BARBITURATES UR QL SCN: POSITIVE
BENZODIAZ UR QL: POSITIVE
BILIRUB UR QL: NEGATIVE
CANNABINOIDS UR QL SCN: POSITIVE
COCAINE UR QL SCN: NEGATIVE
COLOR UR: ABNORMAL
DRUG SCRN COMMENT,DRGCM: ABNORMAL
EPITH CASTS URNS QL MICRO: ABNORMAL /LPF
GLUCOSE UR STRIP.AUTO-MCNC: NEGATIVE MG/DL
HGB UR QL STRIP: NEGATIVE
KETONES UR QL STRIP.AUTO: NEGATIVE MG/DL
LEUKOCYTE ESTERASE UR QL STRIP.AUTO: ABNORMAL
METHADONE UR QL: NEGATIVE
NITRITE UR QL STRIP.AUTO: NEGATIVE
OPIATES UR QL: NEGATIVE
PCP UR QL: NEGATIVE
PH UR STRIP: 6 [PH] (ref 5–8)
PROT UR STRIP-MCNC: NEGATIVE MG/DL
RBC #/AREA URNS HPF: ABNORMAL /HPF (ref 0–5)
SP GR UR REFRACTOMETRY: 1.02 (ref 1–1.03)
UROBILINOGEN UR QL STRIP.AUTO: 0.2 EU/DL (ref 0.2–1)
WBC URNS QL MICRO: ABNORMAL /HPF (ref 0–4)

## 2017-01-07 PROCEDURE — 72125 CT NECK SPINE W/O DYE: CPT

## 2017-01-07 PROCEDURE — 75810000275 HC EMERGENCY DEPT VISIT NO LEVEL OF CARE

## 2017-01-07 PROCEDURE — 71101 X-RAY EXAM UNILAT RIBS/CHEST: CPT

## 2017-01-07 PROCEDURE — 73060 X-RAY EXAM OF HUMERUS: CPT

## 2017-01-07 PROCEDURE — 80307 DRUG TEST PRSMV CHEM ANLYZR: CPT | Performed by: EMERGENCY MEDICINE

## 2017-01-07 PROCEDURE — 74011250636 HC RX REV CODE- 250/636: Performed by: EMERGENCY MEDICINE

## 2017-01-07 PROCEDURE — 81001 URINALYSIS AUTO W/SCOPE: CPT | Performed by: EMERGENCY MEDICINE

## 2017-01-07 PROCEDURE — 70450 CT HEAD/BRAIN W/O DYE: CPT

## 2017-01-07 PROCEDURE — 96372 THER/PROPH/DIAG INJ SC/IM: CPT

## 2017-01-07 PROCEDURE — 99284 EMERGENCY DEPT VISIT MOD MDM: CPT

## 2017-01-07 RX ORDER — KETOROLAC TROMETHAMINE 30 MG/ML
30 INJECTION, SOLUTION INTRAMUSCULAR; INTRAVENOUS
Status: DISCONTINUED | OUTPATIENT
Start: 2017-01-07 | End: 2017-01-07

## 2017-01-07 RX ORDER — LORAZEPAM 1 MG/1
1 TABLET ORAL
COMMUNITY
End: 2017-03-30

## 2017-01-07 RX ORDER — KETOROLAC TROMETHAMINE 30 MG/ML
60 INJECTION, SOLUTION INTRAMUSCULAR; INTRAVENOUS
Status: COMPLETED | OUTPATIENT
Start: 2017-01-07 | End: 2017-01-07

## 2017-01-07 RX ADMIN — KETOROLAC TROMETHAMINE 60 MG: 30 INJECTION, SOLUTION INTRAMUSCULAR at 17:27

## 2017-01-07 NOTE — ED NOTES
When urine sample was sent, pt asked her if he had a black eye. Mother back at the bedside and made aware of suspicion.

## 2017-01-07 NOTE — ED NOTES
Pt given soda pop to encourage need to void for specimen. Mother stepped away from room for a moment, pt resting on stretcher however remains to be fidgety. Pt appears more calm post IM injection for pain.

## 2017-01-07 NOTE — ED NOTES
Pt very agitated and pulled all leads to monitor off,scooted the bottom of the stretcher and got himself fully dressed, pt stated that since we were not treating his pain properly that he was leaving, pt is alert to self,place,time and situation,ambulatory independently. [place] 57 escorted patient to the waiting room. PIV discontinued.

## 2017-01-07 NOTE — ED NOTES
As I was getting ready to medicate patient, patient now stating that he is Allergic to T[place]al and that it makes him have hives. Pt stating/yelling that he wants something a lot stronger, \"there ain't nothing wrong with my stomach,I want something stronger! Tell the doctor I want to see him. \" Dr Olivia Alfonso contacted.

## 2017-01-07 NOTE — ED NOTES
Pt up and ambulating in the room, pt up to void, very small amount obtained and sent to lab. Pt asking for more pain medication,MD made aware. Ice pack provided for patient due to increased swelling to the left cheek bone.

## 2017-01-07 NOTE — ED NOTES
Pt's mother at the stretcher side to transport to CT and X-ray, pt remains to be belligerent and stating that the Tordal will not help his pain and that he wants something stronger. As time passes, pt with a small goose egg to the top of his left cheek bone. However, pt remains to be very impulsive as well and moving impulsively on the stretcher. Pt made aware that we need a urine sample. Water provided.

## 2017-01-07 NOTE — ED TRIAGE NOTES
Assumed patient care from EMS, [place] Justin. Pt with even,unlabored respirations, placed in a position of comfort, connected to the monitor x 3, call bell in reach, pillow and blanket for comfort. Assessment to follow. Pt here today for increased lethargy after being started on new medication yesterday after experiencing a seizure, which he had not had in the last 10 years. Pt also states that he fell down the stairs today and hit the left side of his head along with pain to the left knee. Pt did not arrive via backboard or c-collar, however is moving everything freely without any signs of pain. Dr Puneet Pardo at the bedside to evaluate. Per EMS, pt was not cooperative at the home and upon initial standing, was stumbling and had to be grabbed quickly to make sure he did not fall. Per Spouse at the home, patient has not been drinking nor drugs per spouse. Call bell in reach. Pt very fidgety and impulsive, door open to room for safety precautions.

## 2017-01-07 NOTE — ED NOTES
Pt's mother stepped outside for a moment, mother still remains to state that he is not his normal self. Pt fully dressed once again and stating that he wants to go out side and smoke. Pt talked into walking back into the room. Lights turned off.

## 2017-01-07 NOTE — ED PROVIDER NOTES
HPI Comments: Khanh Logan is a 32 y.o. male with a history of epilepsy, asthma, depression, gastroparesis, and heroin abuse who presents via EMS to [facility] ED with a chief complaint of left cheek, left knee, left arm, and left lateral chest wall pain secondary to a fall down his steps today due to increased lethargy and confusion following an unwitnessed break-through seizure yesterday for which he was evaluated here. Per medical records, patient was discharged with a new prescription for Keppra yesterday, which patient reports he took as prescribed today. Patient states he was previously on Depakote, but notes he had not been taking it for \"a while\". Per medical records, patient's last seizure prior to yesterday was over 10 years ago. Patient denies use of any tobacco products, alcohol, or illicit drugs. Patient denies any neck pain or any other symptoms at this time. PCP: Yaritza lBanco NP    There are no other complaints, changes or physical findings at this time. The history is provided by the patient and medical records. Past Medical History:   Diagnosis Date    Asthma     Depression     Gastrointestinal disorder      abdominal pain    Gastroparesis     GERD (gastroesophageal reflux disease)     Heroin abuse     History of IBS      saw Dr. Kassandra Reed Ill-defined condition      ADHD    Psychiatric disorder      Depression    Seizures [facility])      age 9 y/o - had sz x 2 - was seen by Dr. Rubia Joseph - another sz age 15 y/o, another agoe 15 y/o       Past Surgical History:   Procedure Laterality Date    Hx orthopaedic       right arm fx.     Upper gi endoscopy,biopsy  6/2/2015          Hx other surgical  6/2/15     ESOPHAGOGASTRODUODENAL (EGD) BIOPSY         Family History:   Problem Relation Age of Onset    Anxiety Mother     Anxiety Father     Cancer Maternal Grandfather      bladder     Heart Disease Maternal Grandfather     Diabetes Maternal Grandfather        Social History Social History    Marital status:      Spouse name: N/A    Number of children: N/A    Years of education: N/A     Occupational History    Not on file. Social History Main Topics    Smoking status: Current Every Day Smoker     Packs/day: 1.00     Types: Cigarettes    Smokeless tobacco: Never Used    Alcohol use No      Comment: 6 months     Drug use: Yes     Special: Marijuana, Heroin      Comment: in partial remission    Sexual activity: Yes     Partners: Female     Birth control/ protection: None      Comment: patient has been at [facility]  / relapsed on  a few months ago/ patient was also on methadone      Other Topics Concern    Not on file     Social History Narrative     ALLERGIES: Amoxicillin; Ceclor [cefaclor]; Demerol [meperidine]; Levaquin [levofloxacin]; Lorazepam; Morphine; Tramadol; and Zoloft [sertraline]    Review of Systems   Constitutional: Negative. Negative for appetite change, chills and fever. HENT: Negative for congestion. Positive for left cheek pain   Eyes: Negative. Negative for visual disturbance. Respiratory: Negative. Negative for cough, shortness of breath and wheezing. Cardiovascular: Negative. Negative for chest pain, palpitations and leg swelling. Gastrointestinal: Negative. Negative for abdominal pain. Endocrine: Negative. Genitourinary: Negative. Negative for dysuria, frequency and urgency. Musculoskeletal: Positive for arthralgias (left knee) and myalgias (left arm). Negative for back pain, joint swelling, neck pain and neck stiffness. Positive for left lateral chest wall pain   Skin: Negative. Negative for rash. Allergic/Immunologic: Negative. Neurological: Negative. Negative for dizziness, syncope, weakness and headaches. Hematological: Negative. Negative for adenopathy. Psychiatric/Behavioral: Negative. Negative for behavioral problems and dysphoric mood.    All other systems reviewed and are negative. Patient Vitals for the past 12 hrs:   Temp Pulse Resp BP SpO2   01/07/17 1630 - 98 15 118/69 100 %   01/07/17 1551 98.2 °F (36.8 °C) 90 16 102/75 100 %      Physical Exam   Constitutional: He is oriented to person, place, and time. He appears well-developed and well-nourished. No distress. HENT:   Head: Normocephalic and atraumatic. Mouth/Throat: Oropharynx is clear and moist.   No obvious trauma to the head   Eyes: Conjunctivae and EOM are normal. Pupils are equal, round, and reactive to light. No scleral icterus. Neck: Normal range of motion. Neck supple. C-spine non-tender, full range of motion   Cardiovascular: Normal rate and regular rhythm. Exam reveals no gallop. No murmur heard. Pulmonary/Chest: Effort normal. No stridor. No respiratory distress. He has no wheezes. He has no rales. Left chest wall tender to palpation laterally   Abdominal: Soft. Bowel sounds are normal. He exhibits no distension and no mass. There is no tenderness. There is no rebound and no guarding. Musculoskeletal: Normal range of motion. He exhibits no edema. Tenderness midshaft of the left humerus  Left leg normal, full range of motion   Lymphadenopathy:     He has no cervical adenopathy. Neurological: He is alert and oriented to person, place, and time. No cranial nerve deficit. Coordination normal.   Slow to respond to questions  Confused, agitated   Skin: Skin is warm and dry. No rash noted. No erythema. Psychiatric: He has a normal mood and affect. Nursing note and vitals reviewed.        MDM  Number of Diagnoses or Management Options  Diagnosis management comments: DDx: Sprain, strain, contusion, medication effect, illicit drug use       Amount and/or Complexity of Data Reviewed  Tests in the radiology section of CPT®: ordered and reviewed  Obtain history from someone other than the patient: yes (Medical records)  Review and summarize past medical records: yes    Patient Progress  Patient progress: stable      Procedures     4:09 PM  Reviewed pt's chart; he has a long H/O chronic pain and CVS, as well as narcotic abuse (including heroin). He is continuously asking for pain medication, despite a lack of objective findings suggestive of injury. Pt refusing Toradol. He is very angry that I will not give him Dilaudid or Ativan. When I made it clear that I will not treat him with these agents, he began to tear off his monitors. I suspect he will leave prior to receiving his imaging studies, despite my warning that he might have an undiagnosed injury. He remains adamant that he will leave if I don't give him Dilaudid or Ativan. Mac Guy MD     4:20 PM  Pt eloped. Refused AMA paperwork. Mac Guy MD    4:44 PM  Late Notation: Pt walked out of room to Triage and signed back in, saying he wanted a different MD.  Mac Guy MD     5:15 PM  Chart reactivated; I will remain the physician of record. Imaging to be reordered. Mac Guy MD    6:28 PM  Negative imaging of head, c-spine, humerus, and ribs. No evidence of trauma. Will discharge home, should continue on Keppra until seen by Neurology. Mac Guy MD    7:03 PM  Pt has a large contusion on his face. Staff is suspicious that it could be self-inflicted as a means of obtaining narcotics. CT of head showed no acute injury and no hematoma in the location where there now is one, and none was noted on pt's arrival. Pt is now very agitated because I will not give him a narcotic prescription. Will discharge home. Mac Guy MD      PLAN:  1. Discharge Medication List as of 1/7/2017  4:31 PM      CONTINUE these medications which have NOT CHANGED    Details   LORazepam (ATIVAN) 1 mg tablet Take 1 mg by mouth every four (4) hours as needed for Anxiety. , Historical Med      levETIRAcetam (KEPPRA) 500 mg tablet Take 1 Tab by mouth two (2) times a day., Normal, Disp-60 Tab, R-0      butalbital-acetaminophen-caff (FIORICET) -40 mg per capsule Take 2 Caps by mouth every four (4) hours as needed for Pain. Max Daily Amount: 12 Caps., Print, Disp-20 Cap, R-0      ondansetron (ZOFRAN ODT) 4 mg disintegrating tablet Take 1 Tab by mouth every eight (8) hours as needed for Nausea., Normal, Disp-10 Tab, R-0      polyethylene glycol (MIRALAX) 17 gram/dose powder Take 17 g by mouth daily. 1 tablespoon with 8 oz of water daily, Normal, Disp-510 g, R-0      metoclopramide HCl (REGLAN) 10 mg tablet Take 1 Tab by mouth every six (6) hours as needed., Normal, Disp-20 Tab, R-0      HYDROcodone-acetaminophen (NORCO) 5-325 mg per tablet Take 1 Tab by mouth every four (4) hours as needed for Pain. Max Daily Amount: 6 Tabs., Print, Disp-12 Tab, R-0      diphenoxylate-atropine (LOMOTIL) 2.5-0.025 mg per tablet Take 1 Tab by mouth four (4) times daily as needed for Diarrhea (1 tab after each stool for max 8 per day). Max Daily Amount: 4 Tabs. Take after each stool for a maximum of 8 tablets daily, Print, Disp-20 Tab, R-0      oxyCODONE IR (ROXICODONE) 5 mg immediate release tablet Take 1 Tab by mouth every six (6) hours as needed for Pain (breakthrough pain). Max Daily Amount: 20 mg. Indications: causes drowsiness;do not drink,drive, or use machinery while taking; take with stool softener, Print, Disp-12 Tab, R-0      clonazePAM (KLONOPIN) 1 mg tablet Take 1 Tab by mouth two (2) times a day. Max Daily Amount: 2 mg., Print, Disp-20 Tab, R-0      promethazine (PHENERGAN) 25 mg suppository Insert 1 Suppository into rectum every six (6) hours as needed for Nausea for up to 25 doses. , Normal, Disp-25 Suppository, R-0      promethazine (PHENERGAN) 25 mg tablet Take 1 Tab by mouth every six (6) hours as needed. , Print, Disp-12 Tab, R-0      docusate sodium 100 mg tab Take 1 Tab by mouth two (2) times a day. Indications: Constipation, Normal, Disp-20 Tab, R-0      PARoxetine CR (PAXIL-CR) 25 mg tablet Take 1 Tab by mouth daily. , Normal, Disp-30 Tab, R-1           Return to ED if worse

## 2017-01-07 NOTE — ED NOTES
Pt with blanket pulled up over his head, curtain open to room, noted that the patient is moving arms around head in an odd manor. Questioning whether or not patient is hitting himself in the face. When asked, pt denies hitting himself. MD made aware.

## 2017-01-08 NOTE — ED NOTES
Dr Yana Dozier at the bedside, pt's mother stating that she is concerned that the pt is acting differently. Pt taking Fioricet/ Lorazepam and the new seizure medication can explain why the patient is slightly unsteady and moving sporadically in the room.

## 2017-01-08 NOTE — ED NOTES
Pt up and yelling, stating that he wants something else for his pain. Dr Jose Angel Riddle approached prior to his mother returning to the room. Pt on the telephone to his mother, whom is outside. Mother returning to the room to speak to the doctor.

## 2017-01-08 NOTE — ED NOTES
Dr Carroll Casey has reviewed discharge instructions with the patient and parent. The patient and parent verbalized understanding. Pt with steady gait, Discharge papers in hand. Pt's mother at his side.

## 2017-01-08 NOTE — ED NOTES
Mother back at the bedside, and pt and her yelling back and forth. Pt will listen slightly when mother is around, but as soon as the mother leaves pt goes back to yelling and demanding narcotics.

## 2017-03-30 ENCOUNTER — HOSPITAL ENCOUNTER (EMERGENCY)
Age: 27
Discharge: HOME | End: 2017-03-30
Attending: FAMILY MEDICINE

## 2017-03-30 VITALS
OXYGEN SATURATION: 97 % | HEIGHT: 67 IN | SYSTOLIC BLOOD PRESSURE: 109 MMHG | WEIGHT: 173 LBS | BODY MASS INDEX: 27.15 KG/M2 | DIASTOLIC BLOOD PRESSURE: 56 MMHG | RESPIRATION RATE: 18 BRPM | HEART RATE: 80 BPM | TEMPERATURE: 97.7 F

## 2017-03-30 DIAGNOSIS — K59.03 DRUG-INDUCED CONSTIPATION: Primary | ICD-10-CM

## 2017-03-30 RX ORDER — BUPRENORPHINE AND NALOXONE 8; 2 MG/1; MG/1
FILM, SOLUBLE BUCCAL; SUBLINGUAL DAILY
COMMUNITY
End: 2018-05-31

## 2017-03-30 NOTE — UC PROVIDER NOTE
Patient is a 32 y.o. male presenting with constipation. The history is provided by the patient. Constipation    This is a new problem. Episode onset: 1month ago. The stool is described as hard. Associated symptoms include constipation. Pertinent negatives include no chills and no fever. Risk factors include a change in medication usage/dosage. He has tried oral laxatives for the symptoms. The treatment provided mild relief. His past medical history does not include dementia, neuromuscular disease, irritable bowel syndrome, neurologic disease, small bowel obstruction or abdominal surgery. Past Medical History:   Diagnosis Date    Asthma     Depression     Gastrointestinal disorder     abdominal pain    Gastroparesis     GERD (gastroesophageal reflux disease)     Heroin abuse     History of IBS     saw Dr. Vinh Archuleta Ill-defined condition     ADHD    Psychiatric disorder     Depression    Seizures [facility])     age 7 y/o - had sz x 2 - was seen by Dr. Dixie Lynn - another sz age 15 y/o, another agoe 15 y/o        Past Surgical History:   Procedure Laterality Date    HX ORTHOPAEDIC      right arm fx.  HX OTHER SURGICAL  6/2/15    ESOPHAGOGASTRODUODENAL (EGD) BIOPSY    UPPER GI ENDOSCOPY,BIOPSY  6/2/2015              Family History   Problem Relation Age of Onset    Anxiety Mother     Anxiety Father     Cancer Maternal Grandfather      bladder     Heart Disease Maternal Grandfather     Diabetes Maternal Grandfather         Social History     Social History    Marital status:      Spouse name: N/A    Number of children: N/A    Years of education: N/A     Occupational History    Not on file.      Social History Main Topics    Smoking status: Current Every Day Smoker     Packs/day: 1.00     Types: Cigarettes    Smokeless tobacco: Never Used    Alcohol use No      Comment: 6 months     Drug use: Yes     Special: Marijuana, Heroin      Comment: in partial remission    Sexual activity: Yes Partners: Female     Birth control/ protection: None      Comment: patient has been at [facility]  / relapsed on  a few months ago/ patient was also on methadone      Other Topics Concern    Not on file     Social History Narrative                ALLERGIES: Amoxicillin; Ceclor [cefaclor]; Demerol [meperidine]; Levaquin [levofloxacin]; Lorazepam; Morphine; Tramadol; and Zoloft [sertraline]    Review of Systems   Constitutional: Negative for chills and fever. Gastrointestinal: Positive for constipation. Vitals:    03/30/17 1602 03/30/17 1604   BP:  109/56   Pulse:  80   Resp:  18   Temp:  97.7 °F (36.5 °C)   SpO2:  97%   Weight: 78.5 kg (173 lb)    Height: 5' 7\" (1.702 m)        Physical Exam   Constitutional: He is oriented to person, place, and time. He appears well-developed and well-nourished. Pulmonary/Chest: Effort normal.   Abdominal: Soft. Bowel sounds are normal. He exhibits no distension. There is no tenderness. There is no rebound and no guarding. Neurological: He is alert and oriented to person, place, and time. Skin: Skin is warm and dry. Psychiatric: He has a normal mood and affect. His behavior is normal. Thought content normal.   Nursing note and vitals reviewed. MDM     Differential Diagnosis; Clinical Impression; Plan:     CLINICAL IMPRESSION:  Drug-induced constipation  (primary encounter diagnosis)    Plan:  1. linzess UAD  2.   3.   Risk of Significant Complications, Morbidity, and/or Mortality:   Presenting problems: Moderate  Diagnostic procedures: Moderate  Management options:   Moderate  Progress:   Patient progress:  Stable      Procedures

## 2018-01-20 ENCOUNTER — HOSPITAL ENCOUNTER (EMERGENCY)
Age: 28
Discharge: HOME | End: 2018-01-20
Attending: EMERGENCY MEDICINE
Payer: MEDICAID

## 2018-01-20 VITALS
BODY MASS INDEX: 26.44 KG/M2 | TEMPERATURE: 98 F | DIASTOLIC BLOOD PRESSURE: 61 MMHG | WEIGHT: 168.43 LBS | RESPIRATION RATE: 16 BRPM | OXYGEN SATURATION: 97 % | HEART RATE: 90 BPM | HEIGHT: 67 IN | SYSTOLIC BLOOD PRESSURE: 97 MMHG

## 2018-01-20 DIAGNOSIS — R11.2 NON-INTRACTABLE VOMITING WITH NAUSEA, UNSPECIFIED VOMITING TYPE: Primary | ICD-10-CM

## 2018-01-20 LAB
ALBUMIN SERPL-MCNC: 4.1 G/DL (ref 3.5–5)
ALBUMIN/GLOB SERPL: 1.1 {RATIO} (ref 1.1–2.2)
ALP SERPL-CCNC: 91 U/L (ref 45–117)
ALT SERPL-CCNC: 20 U/L (ref 12–78)
ANION GAP SERPL CALC-SCNC: 10 MMOL/L (ref 5–15)
APPEARANCE UR: CLEAR
AST SERPL-CCNC: 17 U/L (ref 15–37)
BACTERIA URNS QL MICRO: NEGATIVE /HPF
BASOPHILS # BLD: 0 K/UL (ref 0–0.1)
BASOPHILS NFR BLD: 0 % (ref 0–1)
BILIRUB SERPL-MCNC: 0.6 MG/DL (ref 0.2–1)
BILIRUB UR QL CFM: NEGATIVE
BUN SERPL-MCNC: 18 MG/DL (ref 6–20)
BUN/CREAT SERPL: 21 (ref 12–20)
CALCIUM SERPL-MCNC: 9.5 MG/DL (ref 8.5–10.1)
CHLORIDE SERPL-SCNC: 106 MMOL/L (ref 97–108)
CO2 SERPL-SCNC: 23 MMOL/L (ref 21–32)
COLOR UR: ABNORMAL
CREAT SERPL-MCNC: 0.84 MG/DL (ref 0.7–1.3)
EOSINOPHIL # BLD: 0.1 K/UL (ref 0–0.4)
EOSINOPHIL NFR BLD: 2 % (ref 0–7)
EPITH CASTS URNS QL MICRO: ABNORMAL /LPF
ERYTHROCYTE [DISTWIDTH] IN BLOOD BY AUTOMATED COUNT: 12.4 % (ref 11.5–14.5)
GLOBULIN SER CALC-MCNC: 3.6 G/DL (ref 2–4)
GLUCOSE SERPL-MCNC: 93 MG/DL (ref 65–100)
GLUCOSE UR STRIP.AUTO-MCNC: NEGATIVE MG/DL
HCT VFR BLD AUTO: 40.8 % (ref 36.6–50.3)
HGB BLD-MCNC: 14.3 G/DL (ref 12.1–17)
HGB UR QL STRIP: NEGATIVE
HYALINE CASTS URNS QL MICRO: ABNORMAL /LPF (ref 0–5)
KETONES UR QL STRIP.AUTO: 15 MG/DL
LEUKOCYTE ESTERASE UR QL STRIP.AUTO: NEGATIVE
LIPASE SERPL-CCNC: 63 U/L (ref 73–393)
LYMPHOCYTES # BLD: 1.9 K/UL (ref 0.8–3.5)
LYMPHOCYTES NFR BLD: 32 % (ref 12–49)
MCH RBC QN AUTO: 30.4 PG (ref 26–34)
MCHC RBC AUTO-ENTMCNC: 35 G/DL (ref 30–36.5)
MCV RBC AUTO: 86.6 FL (ref 80–99)
MONOCYTES # BLD: 0.5 K/UL (ref 0–1)
MONOCYTES NFR BLD: 8 % (ref 5–13)
NEUTS SEG # BLD: 3.4 K/UL (ref 1.8–8)
NEUTS SEG NFR BLD: 58 % (ref 32–75)
NITRITE UR QL STRIP.AUTO: NEGATIVE
PH UR STRIP: 5.5 [PH] (ref 5–8)
PLATELET # BLD AUTO: 222 K/UL (ref 150–400)
POTASSIUM SERPL-SCNC: 3.8 MMOL/L (ref 3.5–5.1)
PROT SERPL-MCNC: 7.7 G/DL (ref 6.4–8.2)
PROT UR STRIP-MCNC: NEGATIVE MG/DL
RBC # BLD AUTO: 4.71 M/UL (ref 4.1–5.7)
RBC #/AREA URNS HPF: ABNORMAL /HPF (ref 0–5)
SODIUM SERPL-SCNC: 139 MMOL/L (ref 136–145)
SP GR UR REFRACTOMETRY: 1.02 (ref 1–1.03)
UA: UC IF INDICATED,UAUC: ABNORMAL
UROBILINOGEN UR QL STRIP.AUTO: 0.2 EU/DL (ref 0.2–1)
WBC # BLD AUTO: 5.9 K/UL (ref 4.1–11.1)
WBC URNS QL MICRO: ABNORMAL /HPF (ref 0–4)

## 2018-01-20 PROCEDURE — 96374 THER/PROPH/DIAG INJ IV PUSH: CPT

## 2018-01-20 PROCEDURE — 85025 COMPLETE CBC W/AUTO DIFF WBC: CPT | Performed by: EMERGENCY MEDICINE

## 2018-01-20 PROCEDURE — 36415 COLL VENOUS BLD VENIPUNCTURE: CPT | Performed by: EMERGENCY MEDICINE

## 2018-01-20 PROCEDURE — 96361 HYDRATE IV INFUSION ADD-ON: CPT

## 2018-01-20 PROCEDURE — 74011250637 HC RX REV CODE- 250/637: Performed by: EMERGENCY MEDICINE

## 2018-01-20 PROCEDURE — 99283 EMERGENCY DEPT VISIT LOW MDM: CPT

## 2018-01-20 PROCEDURE — 83690 ASSAY OF LIPASE: CPT | Performed by: EMERGENCY MEDICINE

## 2018-01-20 PROCEDURE — 96372 THER/PROPH/DIAG INJ SC/IM: CPT

## 2018-01-20 PROCEDURE — 74011250636 HC RX REV CODE- 250/636: Performed by: EMERGENCY MEDICINE

## 2018-01-20 PROCEDURE — 81001 URINALYSIS AUTO W/SCOPE: CPT | Performed by: EMERGENCY MEDICINE

## 2018-01-20 PROCEDURE — 80053 COMPREHEN METABOLIC PANEL: CPT | Performed by: EMERGENCY MEDICINE

## 2018-01-20 RX ORDER — ONDANSETRON 2 MG/ML
INJECTION INTRAMUSCULAR; INTRAVENOUS
Status: DISCONTINUED
Start: 2018-01-20 | End: 2018-01-21 | Stop reason: HOSPADM

## 2018-01-20 RX ORDER — HALOPERIDOL 5 MG/ML
5 INJECTION INTRAMUSCULAR ONCE
Status: COMPLETED | OUTPATIENT
Start: 2018-01-20 | End: 2018-01-20

## 2018-01-20 RX ORDER — PROMETHAZINE HYDROCHLORIDE 25 MG/1
25 TABLET ORAL
Qty: 12 TAB | Refills: 0 | Status: SHIPPED | OUTPATIENT
Start: 2018-01-20 | End: 2018-05-31

## 2018-01-20 RX ORDER — DIAZEPAM 5 MG/1
5 TABLET ORAL
Status: COMPLETED | OUTPATIENT
Start: 2018-01-20 | End: 2018-01-20

## 2018-01-20 RX ORDER — SODIUM CHLORIDE 9 MG/ML
1000 INJECTION, SOLUTION INTRAVENOUS ONCE
Status: COMPLETED | OUTPATIENT
Start: 2018-01-20 | End: 2018-01-20

## 2018-01-20 RX ORDER — ONDANSETRON 2 MG/ML
4 INJECTION INTRAMUSCULAR; INTRAVENOUS ONCE
Status: COMPLETED | OUTPATIENT
Start: 2018-01-20 | End: 2018-01-20

## 2018-01-20 RX ADMIN — SODIUM CHLORIDE 1000 ML: 900 INJECTION, SOLUTION INTRAVENOUS at 20:02

## 2018-01-20 RX ADMIN — DIAZEPAM 5 MG: 5 TABLET ORAL at 21:43

## 2018-01-20 RX ADMIN — HALOPERIDOL LACTATE 5 MG: 5 INJECTION INTRAMUSCULAR at 20:02

## 2018-01-20 RX ADMIN — ONDANSETRON HYDROCHLORIDE 4 MG: 2 INJECTION, SOLUTION INTRAMUSCULAR; INTRAVENOUS at 21:01

## 2018-01-21 NOTE — DISCHARGE INSTRUCTIONS
Nausea and Vomiting: Care Instructions  Your Care Instructions    When you are nauseated, you may feel weak and sweaty and notice a lot of saliva in your mouth. Nausea often leads to vomiting. Most of the time you do not need to worry about nausea and vomiting, but they can be signs of other illnesses. Two common causes of nausea and vomiting are stomach flu and food poisoning. Nausea and vomiting from viral stomach flu will usually start to improve within 24 hours. Nausea and vomiting from food poisoning may last from 12 to 48 hours. The doctor has checked you carefully, but problems can develop later. If you notice any problems or new symptoms, get medical treatment right away. Follow-up care is a key part of your treatment and safety. Be sure to make and go to all appointments, and call your doctor if you are having problems. It's also a good idea to know your test results and keep a list of the medicines you take. How can you care for yourself at home? · To prevent dehydration, drink plenty of fluids, enough so that your urine is light yellow or clear like water. Choose water and other caffeine-free clear liquids until you feel better. If you have kidney, heart, or liver disease and have to limit fluids, talk with your doctor before you increase the amount of fluids you drink. · Rest in bed until you feel better. · When you are able to eat, try clear soups, mild foods, and liquids until all symptoms are gone for 12 to 48 hours. Other good choices include dry toast, crackers, cooked cereal, and gelatin dessert, such as Jell-O. When should you call for help? Call 911 anytime you think you may need emergency care. For example, call if:  ? · You passed out (lost consciousness). ?Call your doctor now or seek immediate medical care if:  ? · You have symptoms of dehydration, such as:  ¨ Dry eyes and a dry mouth. ¨ Passing only a little dark urine.   ¨ Feeling thirstier than usual.   ? · You have new or worsening belly pain. ? · You have a new or higher fever. ? · You vomit blood or what looks like coffee grounds. ? Watch closely for changes in your health, and be sure to contact your doctor if:  ? · You have ongoing nausea and vomiting. ? · Your vomiting is getting worse. ? · Your vomiting lasts longer than 2 days. ? · You are not getting better as expected. Where can you learn more? Go to http://zhane-francis.info/. Enter 25 [id number] in the search box to learn more about \"Nausea and Vomiting: Care Instructions. \"  Current as of: March 20, 2017  Content Version: 11.4  © 9973-8477 [employer]. Care instructions adapted under license by [employer] (which disclaims liability or warranty for this information). If you have questions about a medical condition or this instruction, always ask your healthcare professional. [address] any warranty or liability for your use of this information.

## 2018-01-21 NOTE — ED PROVIDER NOTES
EMERGENCY DEPARTMENT HISTORY AND PHYSICAL EXAM      Date: 1/20/2018  Patient Name: Daniele Lora    History of Presenting Illness     Chief Complaint   Patient presents with    Abdominal Pain     generalized for 2 days    Vomiting     nausea and vomiting for 2 days       History Provided By: Patient    HPI: Daniele Lora, 32 y.o. male with PMHx significant for GERD, IBS, gastroparesis, and depression, presents ambulatory to the ED with cc of persistent diffuse abdominal pain with N/V/D x 2 days. Pt estimates five episodes of non-bloody, non-bilious emesis today. He notes hx of gastroparesis, seen by Dr. Leighann Meyer, however states he has not had a flare in several years. Pt denies taking any medications for his current sx's and is not on daily pain medication. He explains he has not been able to tolerate any PO. Of note, pt endorses marijuana use several weeks ago. In addition, he reports sore throat, noting his wife was dx with influenza last night. Pt specifically denies any congestion or cough. PCP: Soraya Hinojosa NP  GI: Ady Acosta MD    There are no other complaints, changes, or physical findings at this time. Current Outpatient Prescriptions   Medication Sig Dispense Refill    promethazine (PHENERGAN) 25 mg tablet Take 1 Tab by mouth every six (6) hours as needed. 12 Tab 0    TOPIRAMATE (TOPAMAX PO) Take  by mouth.  buprenorphine-naloxone (SUBOXONE) 8-2 mg film sublingaul film by SubLINGual route daily.  linaclotide (LINZESS) 145 mcg cap capsule Take 1 Cap by mouth Daily (before breakfast). 30 Cap 0    docusate sodium 100 mg tab Take 1 Tab by mouth two (2) times a day. Indications: Constipation 20 Tab 0    PARoxetine CR (PAXIL-CR) 25 mg tablet Take 1 Tab by mouth daily.  30 Tab 1       Past History     Past Medical History:  Past Medical History:   Diagnosis Date    Asthma     Depression     Gastrointestinal disorder     abdominal pain    Gastroparesis     GERD (gastroesophageal reflux disease)     Heroin abuse     History of IBS     saw Dr. Brina Webster Ill-defined condition     ADHD    Psychiatric disorder     Depression    Seizures [facility])     age 9 y/o - had sz x 2 - was seen by Dr. Jaime Hoskins - another sz age 15 y/o, another agoe 15 y/o       Past Surgical History:  Past Surgical History:   Procedure Laterality Date    HX ORTHOPAEDIC      right arm fx.  HX OTHER SURGICAL  6/2/15    ESOPHAGOGASTRODUODENAL (EGD) BIOPSY    UPPER GI ENDOSCOPY,BIOPSY  6/2/2015            Family History:  Family History   Problem Relation Age of Onset    Anxiety Mother     Anxiety Father     Cancer Maternal Grandfather      bladder     Heart Disease Maternal Grandfather     Diabetes Maternal Grandfather        Social History:  Social History   Substance Use Topics    Smoking status: Current Every Day Smoker     Packs/day: 1.00     Types: Cigarettes    Smokeless tobacco: Never Used    Alcohol use No      Comment: 6 months        Allergies: Allergies   Allergen Reactions    Amoxicillin Hives    Ceclor [Cefaclor] Hives    Demerol [Meperidine] Hives     seizures    Levaquin [Levofloxacin] Hives    Lorazepam Other (comments)     A/V hallucinations     Morphine Hives    Tramadol Hives     seizures    Zoloft [Sertraline] Other (comments)     ED         Review of Systems   Review of Systems   Constitutional: Negative for chills and fever. HENT: Positive for sore throat. Negative for congestion. Eyes: Negative for visual disturbance. Respiratory: Negative for cough and shortness of breath. Cardiovascular: Negative for chest pain and leg swelling. Gastrointestinal: Positive for abdominal pain (diffuse), diarrhea, nausea and vomiting. Negative for blood in stool. Endocrine: Negative for polyuria. Genitourinary: Negative for dysuria and testicular pain. Musculoskeletal: Negative for arthralgias, joint swelling and myalgias. Skin: Negative for rash.    Allergic/Immunologic: Negative for immunocompromised state. Neurological: Negative for weakness and headaches. Hematological: Does not bruise/bleed easily. Psychiatric/Behavioral: Negative for confusion. Physical Exam   Physical Exam   Constitutional: He is oriented to person, place, and time. He appears well-developed and well-nourished. HENT:   Head: Normocephalic and atraumatic. Moist mucous membranes   Eyes: Conjunctivae are normal. Pupils are equal, round, and reactive to light. Right eye exhibits no discharge. Left eye exhibits no discharge. Neck: Normal range of motion. Neck supple. No tracheal deviation present. Cardiovascular: Normal rate, regular rhythm and normal heart sounds. No murmur heard. Pulmonary/Chest: Effort normal and breath sounds normal. No respiratory distress. He has no wheezes. He has no rales. Abdominal: Soft. Bowel sounds are normal. There is tenderness in the right upper quadrant. There is no rebound and no guarding. Musculoskeletal: Normal range of motion. He exhibits no edema, tenderness or deformity. Neurological: He is alert and oriented to person, place, and time. Skin: Skin is warm and dry. No rash noted. No erythema. Psychiatric: His behavior is normal.   Nursing note and vitals reviewed.         Diagnostic Study Results     Labs -     Recent Results (from the past 12 hour(s))   URINALYSIS W/ REFLEX CULTURE    Collection Time: 01/20/18  6:42 PM   Result Value Ref Range    Color DARK YELLOW      Appearance CLEAR CLEAR      Specific gravity 1.024 1.003 - 1.030      pH (UA) 5.5 5.0 - 8.0      Protein NEGATIVE  NEG mg/dL    Glucose NEGATIVE  NEG mg/dL    Ketone 15 (A) NEG mg/dL    Blood NEGATIVE  NEG      Urobilinogen 0.2 0.2 - 1.0 EU/dL    Nitrites NEGATIVE  NEG      Leukocyte Esterase NEGATIVE  NEG      WBC 0-4 0 - 4 /hpf    RBC 0-5 0 - 5 /hpf    Epithelial cells FEW FEW /lpf    Bacteria NEGATIVE  NEG /hpf    UA:UC IF INDICATED CULTURE NOT INDICATED BY UA RESULT CNI      Hyaline cast 0-2 0 - 5 /lpf   BILIRUBIN, CONFIRM    Collection Time: 01/20/18  6:42 PM   Result Value Ref Range    Bilirubin UA, confirm NEGATIVE  NEG     CBC WITH AUTOMATED DIFF    Collection Time: 01/20/18  8:02 PM   Result Value Ref Range    WBC 5.9 4.1 - 11.1 K/uL    RBC 4.71 4.10 - 5.70 M/uL    HGB 14.3 12.1 - 17.0 g/dL    HCT 40.8 36.6 - 50.3 %    MCV 86.6 80.0 - 99.0 FL    MCH 30.4 26.0 - 34.0 PG    MCHC 35.0 30.0 - 36.5 g/dL    RDW 12.4 11.5 - 14.5 %    PLATELET 630 024 - 370 K/uL    NEUTROPHILS 58 32 - 75 %    LYMPHOCYTES 32 12 - 49 %    MONOCYTES 8 5 - 13 %    EOSINOPHILS 2 0 - 7 %    BASOPHILS 0 0 - 1 %    ABS. NEUTROPHILS 3.4 1.8 - 8.0 K/UL    ABS. LYMPHOCYTES 1.9 0.8 - 3.5 K/UL    ABS. MONOCYTES 0.5 0.0 - 1.0 K/UL    ABS. EOSINOPHILS 0.1 0.0 - 0.4 K/UL    ABS. BASOPHILS 0.0 0.0 - 0.1 K/UL   LIPASE    Collection Time: 01/20/18  8:02 PM   Result Value Ref Range    Lipase 63 (L) 73 - 106 U/L   METABOLIC PANEL, COMPREHENSIVE    Collection Time: 01/20/18  8:02 PM   Result Value Ref Range    Sodium 139 136 - 145 mmol/L    Potassium 3.8 3.5 - 5.1 mmol/L    Chloride 106 97 - 108 mmol/L    CO2 23 21 - 32 mmol/L    Anion gap 10 5 - 15 mmol/L    Glucose 93 65 - 100 mg/dL    BUN 18 6 - 20 MG/DL    Creatinine 0.84 0.70 - 1.30 MG/DL    BUN/Creatinine ratio 21 (H) 12 - 20      GFR est AA >60 >60 ml/min/1.73m2    GFR est non-AA >60 >60 ml/min/1.73m2    Calcium 9.5 8.5 - 10.1 MG/DL    Bilirubin, total 0.6 0.2 - 1.0 MG/DL    ALT (SGPT) 20 12 - 78 U/L    AST (SGOT) 17 15 - 37 U/L    Alk. phosphatase 91 45 - 117 U/L    Protein, total 7.7 6.4 - 8.2 g/dL    Albumin 4.1 3.5 - 5.0 g/dL    Globulin 3.6 2.0 - 4.0 g/dL    A-G Ratio 1.1 1.1 - 2.2         Medical Decision Making   I am the first provider for this patient. I reviewed the vital signs, available nursing notes, past medical history, past surgical history, family history and social history. Vital Signs-Reviewed the patient's vital signs.   Patient Vitals for the past 12 hrs:   Temp Pulse Resp BP SpO2   01/20/18 2100 - - - 97/61 97 %   01/20/18 2029 - - - - 100 %   01/20/18 2028 - - - 101/67 -   01/20/18 1830 98 °F (36.7 °C) 90 16 (!) 161/134 100 %       Pulse Oximetry Analysis - 100% on RA    Cardiac Monitor:   Rate: 90 bpm  Rhythm: Normal Sinus Rhythm      Records Reviewed: Nursing Notes and Old Medical Records    Provider Notes (Medical Decision Making):     DDx: gastroenteritis, pancreatitis, opioid withdrawal, cholecystitis. No concern for appendicitis based on hx and PE. Cannabinoid Hyperemesis Syndrome. Will treat symptomatically. No imaging needed. ED Course:   Initial assessment performed. The patients presenting problems have been discussed, and they are in agreement with the care plan formulated and outlined with them. I have encouraged them to ask questions as they arise throughout their visit. 9:16 PM  Pt says that his sx's are unchanged. He asked specifically for dilaudid IV. I told him I did not feel comfortable with giving him that medication. He then relayed to me that he may be withdrawaling  from suboxone as he has not had any in several days. He asked if there is anything else Bladimir Adela" that I can give him to help him feel better. Written by Delta Cheng ED scribe, as dictated by Neeraj Rust, DO    Patient reports he is possibly withdrawing from his methadone. I will give him some valium here to help his symptoms. He will follow up with pain management. Disposition:    DISCHARGE NOTE:  9:20 PM  The patient is ready for discharge. The patients signs, symptoms, diagnosis, and instructions for discharge have been discussed and the pt has conveyed their understanding. The patient is to follow up as recommended with PCP or return to the ER should their symptoms worsen. Plan has been discussed and patient has conveyed their agreement. PLAN:  1.  Discharge home   Discharge Medication List as of 1/20/2018  9:20 PM      START taking these medications Details   promethazine (PHENERGAN) 25 mg tablet Take 1 Tab by mouth every six (6) hours as needed. , Print, Disp-12 Tab, R-0         CONTINUE these medications which have NOT CHANGED    Details   TOPIRAMATE (TOPAMAX PO) Take  by mouth., Historical Med      buprenorphine-naloxone (SUBOXONE) 8-2 mg film sublingaul film by SubLINGual route daily. , Historical Med      linaclotide (LINZESS) 145 mcg cap capsule Take 1 Cap by mouth Daily (before breakfast). , Print, Disp-30 Cap, R-0      docusate sodium 100 mg tab Take 1 Tab by mouth two (2) times a day. Indications: Constipation, Normal, Disp-20 Tab, R-0      PARoxetine CR (PAXIL-CR) 25 mg tablet Take 1 Tab by mouth daily. , Normal, Disp-30 Tab, R-1           2. Follow-up Information     Follow up With Details Comments Contact Info    The [address] [place] [address] Drive Call in 1 day  [address] [address]  [facility]  [phone]        Return to ED if worse     Diagnosis     Clinical Impression:   1. Non-intractable vomiting with nausea, unspecified vomiting type        Attestations: This note is prepared by Trista Alexander, acting as Scribe for Chioma Waddell DO. Chioma Waddell DO: The scribe's documentation has been prepared under my direction and personally reviewed by me in its entirety. I confirm that the note above accurately reflects all work, treatment, procedures, and medical decision making performed by me.

## 2018-01-21 NOTE — ED NOTES
Pt complaining that pain has not improved at this time and he still has nausea. This RN explained to pt the IM injections do not work immediately.  Notified MD.

## 2018-01-21 NOTE — ED NOTES
Pt moaning and dry heaving in room. Administered zofran. Pt states he vomited once and placed the green bag in the trash. This RN found two empty green bags in trash. While this RN checked trash to assess emesis, pt stated \"no, actually I went to the bathroom\". Pt has been attached to monitor and has not gone to the bathroom. Pt states \"ummmm I don't know. I don't know what I did with it. I don't know. I lost my train of thought\". MD aware.

## 2018-01-21 NOTE — ED NOTES
Assumed care of pt from triage. Pt complaining of generalized abdominal pain and vomiting x2 days. Pt admits to marijuana use \"a couple weeks ago\". Pt states he is unable to keep fluids down. Pt states his wife is sick with the flu. pt has some diarrhea, but denies flu like symptoms. Pt states he was diagnosed with gastroparesis in the past. MD at bedside. Pt in no acute distress at this time. Call bell within reach.

## 2018-05-23 ENCOUNTER — HOSPITAL ENCOUNTER (EMERGENCY)
Age: 28
Discharge: HOME | End: 2018-05-23
Attending: EMERGENCY MEDICINE
Payer: MEDICAID

## 2018-05-23 VITALS
RESPIRATION RATE: 18 BRPM | BODY MASS INDEX: 26.68 KG/M2 | OXYGEN SATURATION: 100 % | SYSTOLIC BLOOD PRESSURE: 128 MMHG | HEART RATE: 62 BPM | DIASTOLIC BLOOD PRESSURE: 80 MMHG | HEIGHT: 67 IN | TEMPERATURE: 98.2 F | WEIGHT: 170 LBS

## 2018-05-23 DIAGNOSIS — F11.23 OPIOID DEPENDENCE WITH WITHDRAWAL (HCC): Primary | ICD-10-CM

## 2018-05-23 DIAGNOSIS — R11.2 NON-INTRACTABLE VOMITING WITH NAUSEA, UNSPECIFIED VOMITING TYPE: ICD-10-CM

## 2018-05-23 LAB
ALBUMIN SERPL-MCNC: 4.5 G/DL (ref 3.5–5)
ALBUMIN/GLOB SERPL: 1.2 {RATIO} (ref 1.1–2.2)
ALP SERPL-CCNC: 80 U/L (ref 45–117)
ALT SERPL-CCNC: 25 U/L (ref 12–78)
AMPHET UR QL SCN: POSITIVE
ANION GAP SERPL CALC-SCNC: 9 MMOL/L (ref 5–15)
AST SERPL-CCNC: 11 U/L (ref 15–37)
BARBITURATES UR QL SCN: NEGATIVE
BASOPHILS # BLD: 0 K/UL (ref 0–0.1)
BASOPHILS NFR BLD: 0 % (ref 0–1)
BENZODIAZ UR QL: NEGATIVE
BILIRUB SERPL-MCNC: 0.7 MG/DL (ref 0.2–1)
BUN SERPL-MCNC: 15 MG/DL (ref 6–20)
BUN/CREAT SERPL: 14 (ref 12–20)
CALCIUM SERPL-MCNC: 10.5 MG/DL (ref 8.5–10.1)
CANNABINOIDS UR QL SCN: NEGATIVE
CHLORIDE SERPL-SCNC: 104 MMOL/L (ref 97–108)
CO2 SERPL-SCNC: 26 MMOL/L (ref 21–32)
COCAINE UR QL SCN: NEGATIVE
CREAT SERPL-MCNC: 1.06 MG/DL (ref 0.7–1.3)
DIFFERENTIAL METHOD BLD: ABNORMAL
DRUG SCRN COMMENT,DRGCM: ABNORMAL
EOSINOPHIL # BLD: 0.1 K/UL (ref 0–0.4)
EOSINOPHIL NFR BLD: 1 % (ref 0–7)
ERYTHROCYTE [DISTWIDTH] IN BLOOD BY AUTOMATED COUNT: 11.9 % (ref 11.5–14.5)
GLOBULIN SER CALC-MCNC: 3.8 G/DL (ref 2–4)
GLUCOSE SERPL-MCNC: 115 MG/DL (ref 65–100)
HCT VFR BLD AUTO: 42.3 % (ref 36.6–50.3)
HGB BLD-MCNC: 14.8 G/DL (ref 12.1–17)
IMM GRANULOCYTES # BLD: 0 K/UL (ref 0–0.04)
IMM GRANULOCYTES NFR BLD AUTO: 0 % (ref 0–0.5)
LIPASE SERPL-CCNC: 102 U/L (ref 73–393)
LYMPHOCYTES # BLD: 2.7 K/UL (ref 0.8–3.5)
LYMPHOCYTES NFR BLD: 22 % (ref 12–49)
MCH RBC QN AUTO: 30 PG (ref 26–34)
MCHC RBC AUTO-ENTMCNC: 35 G/DL (ref 30–36.5)
MCV RBC AUTO: 85.6 FL (ref 80–99)
METHADONE UR QL: POSITIVE
MONOCYTES # BLD: 0.8 K/UL (ref 0–1)
MONOCYTES NFR BLD: 6 % (ref 5–13)
NEUTS SEG # BLD: 8.5 K/UL (ref 1.8–8)
NEUTS SEG NFR BLD: 71 % (ref 32–75)
NRBC # BLD: 0 K/UL (ref 0–0.01)
NRBC BLD-RTO: 0 PER 100 WBC
OPIATES UR QL: NEGATIVE
PCP UR QL: NEGATIVE
PLATELET # BLD AUTO: 260 K/UL (ref 150–400)
PMV BLD AUTO: 10.1 FL (ref 8.9–12.9)
POTASSIUM SERPL-SCNC: 3.8 MMOL/L (ref 3.5–5.1)
PROT SERPL-MCNC: 8.3 G/DL (ref 6.4–8.2)
RBC # BLD AUTO: 4.94 M/UL (ref 4.1–5.7)
SODIUM SERPL-SCNC: 139 MMOL/L (ref 136–145)
WBC # BLD AUTO: 12.1 K/UL (ref 4.1–11.1)

## 2018-05-23 PROCEDURE — 36415 COLL VENOUS BLD VENIPUNCTURE: CPT | Performed by: PHYSICIAN ASSISTANT

## 2018-05-23 PROCEDURE — 83690 ASSAY OF LIPASE: CPT | Performed by: PHYSICIAN ASSISTANT

## 2018-05-23 PROCEDURE — 74011250636 HC RX REV CODE- 250/636: Performed by: EMERGENCY MEDICINE

## 2018-05-23 PROCEDURE — 85025 COMPLETE CBC W/AUTO DIFF WBC: CPT | Performed by: PHYSICIAN ASSISTANT

## 2018-05-23 PROCEDURE — 74011250636 HC RX REV CODE- 250/636: Performed by: PHYSICIAN ASSISTANT

## 2018-05-23 PROCEDURE — 80053 COMPREHEN METABOLIC PANEL: CPT | Performed by: PHYSICIAN ASSISTANT

## 2018-05-23 PROCEDURE — 99284 EMERGENCY DEPT VISIT MOD MDM: CPT

## 2018-05-23 PROCEDURE — 96374 THER/PROPH/DIAG INJ IV PUSH: CPT

## 2018-05-23 PROCEDURE — 74011250637 HC RX REV CODE- 250/637: Performed by: EMERGENCY MEDICINE

## 2018-05-23 PROCEDURE — 96375 TX/PRO/DX INJ NEW DRUG ADDON: CPT

## 2018-05-23 PROCEDURE — 80307 DRUG TEST PRSMV CHEM ANLYZR: CPT | Performed by: PHYSICIAN ASSISTANT

## 2018-05-23 RX ORDER — KETOROLAC TROMETHAMINE 30 MG/ML
15 INJECTION, SOLUTION INTRAMUSCULAR; INTRAVENOUS
Status: COMPLETED | OUTPATIENT
Start: 2018-05-23 | End: 2018-05-23

## 2018-05-23 RX ORDER — METHOCARBAMOL 500 MG/1
500 TABLET, FILM COATED ORAL
Status: COMPLETED | OUTPATIENT
Start: 2018-05-23 | End: 2018-05-23

## 2018-05-23 RX ORDER — CYCLOBENZAPRINE HCL 5 MG
5 TABLET ORAL
Qty: 20 TAB | Refills: 0 | Status: SHIPPED | OUTPATIENT
Start: 2018-05-23 | End: 2018-05-31

## 2018-05-23 RX ORDER — CLONIDINE HYDROCHLORIDE 0.1 MG/1
0.1 TABLET ORAL 3 TIMES DAILY
Qty: 20 TAB | Refills: 0 | Status: SHIPPED | OUTPATIENT
Start: 2018-05-23 | End: 2018-05-31

## 2018-05-23 RX ORDER — METOCLOPRAMIDE HYDROCHLORIDE 5 MG/ML
10 INJECTION INTRAMUSCULAR; INTRAVENOUS
Status: COMPLETED | OUTPATIENT
Start: 2018-05-23 | End: 2018-05-23

## 2018-05-23 RX ORDER — ONDANSETRON 2 MG/ML
4 INJECTION INTRAMUSCULAR; INTRAVENOUS
Status: COMPLETED | OUTPATIENT
Start: 2018-05-23 | End: 2018-05-23

## 2018-05-23 RX ORDER — SODIUM CHLORIDE 0.9 % (FLUSH) 0.9 %
5-10 SYRINGE (ML) INJECTION AS NEEDED
Status: DISCONTINUED | OUTPATIENT
Start: 2018-05-23 | End: 2018-05-24 | Stop reason: HOSPADM

## 2018-05-23 RX ORDER — BUTALBITAL, ACETAMINOPHEN AND CAFFEINE 300; 40; 50 MG/1; MG/1; MG/1
1 CAPSULE ORAL
Qty: 20 CAP | Refills: 0 | Status: SHIPPED | OUTPATIENT
Start: 2018-05-23 | End: 2018-05-31

## 2018-05-23 RX ORDER — ONDANSETRON 4 MG/1
4 TABLET, ORALLY DISINTEGRATING ORAL
Status: DISCONTINUED | OUTPATIENT
Start: 2018-05-23 | End: 2018-05-23

## 2018-05-23 RX ORDER — ONDANSETRON 4 MG/1
4 TABLET, FILM COATED ORAL
Qty: 20 TAB | Refills: 0 | Status: SHIPPED | OUTPATIENT
Start: 2018-05-23 | End: 2020-01-08

## 2018-05-23 RX ORDER — BUTALBITAL, ACETAMINOPHEN AND CAFFEINE 50; 325; 40 MG/1; MG/1; MG/1
1 TABLET ORAL
Status: COMPLETED | OUTPATIENT
Start: 2018-05-23 | End: 2018-05-23

## 2018-05-23 RX ORDER — TOPIRAMATE 25 MG/1
50 TABLET ORAL
Status: COMPLETED | OUTPATIENT
Start: 2018-05-23 | End: 2018-05-23

## 2018-05-23 RX ORDER — SODIUM CHLORIDE 0.9 % (FLUSH) 0.9 %
5-10 SYRINGE (ML) INJECTION EVERY 8 HOURS
Status: DISCONTINUED | OUTPATIENT
Start: 2018-05-23 | End: 2018-05-24 | Stop reason: HOSPADM

## 2018-05-23 RX ORDER — CYCLOBENZAPRINE HCL 10 MG
10 TABLET ORAL
Status: DISCONTINUED | OUTPATIENT
Start: 2018-05-23 | End: 2018-05-23

## 2018-05-23 RX ORDER — CLONIDINE HYDROCHLORIDE 0.1 MG/1
0.1 TABLET ORAL
Status: COMPLETED | OUTPATIENT
Start: 2018-05-23 | End: 2018-05-23

## 2018-05-23 RX ADMIN — CLONIDINE HYDROCHLORIDE 0.1 MG: 0.1 TABLET ORAL at 19:42

## 2018-05-23 RX ADMIN — METOCLOPRAMIDE 10 MG: 5 INJECTION, SOLUTION INTRAMUSCULAR; INTRAVENOUS at 20:19

## 2018-05-23 RX ADMIN — METHOCARBAMOL 500 MG: 500 TABLET ORAL at 21:25

## 2018-05-23 RX ADMIN — TOPIRAMATE 50 MG: 25 TABLET, FILM COATED ORAL at 20:22

## 2018-05-23 RX ADMIN — BUTALBITAL, ACETAMINOPHEN AND CAFFEINE 1 TABLET: 50; 325; 40 TABLET ORAL at 20:22

## 2018-05-23 RX ADMIN — ONDANSETRON 4 MG: 2 INJECTION INTRAMUSCULAR; INTRAVENOUS at 19:42

## 2018-05-23 RX ADMIN — SODIUM CHLORIDE 1000 ML: 900 INJECTION, SOLUTION INTRAVENOUS at 20:19

## 2018-05-23 RX ADMIN — KETOROLAC TROMETHAMINE 15 MG: 30 INJECTION, SOLUTION INTRAMUSCULAR; INTRAVENOUS at 19:42

## 2018-05-23 NOTE — ED PROVIDER NOTES
PROVIDER IN TRIAGE NOTE:  6:39 PM  Tatiana Gilmore PA-C has evaluated the patient as the Provider in Triage (PIT). OPt presents to the Emergency Dept with c/o intractable N/V. He reports he is withdrawing from Methadone. They have reviewed the vital signs and the triage nurse assessment. They have talked with the patient and any available family and advised that the appropriate studies have been ordered to initiate the work up based on the clinical presentation during the assessment. The pt has been advised that they will be accommodated in the Main ED as soon as possible. The pt has been requested to contact the triage nurse or PIT immediately if they experiences any changes in their condition during this brief waiting period. EMERGENCY DEPARTMENT HISTORY AND PHYSICAL EXAM      Date: 5/23/2018  Patient Name: Paulette Dunn    History of Presenting Illness     Chief Complaint   Patient presents with    Vomiting     Nausea, vomiting, and abdominal pain since last night, pt reports he is detoxing from methadone and reports last dose was 8 days ago       History Provided By: Patient    HPI: Paulette Dunn, 32 y.o. male with PMHx significant for Asthma, seizures, GERD, IBS, depression, gastroparesis, and heroin abuse, presents ambulatory to the ED with cc of n/v, abd pain, diarrhea, and diaphoresis s/p methadone withdrawal onset last night. Pt reports that he quit methadone abruptly 8 days ago. He reports that symptoms came on last night. He reports that he was obtaining methadone at [facility] methadone clinic. He states that he has not been able to keep down his Topomax 50mg BID due to n/v. Pt specifically denies CP and SOB. Chief Complaint: Abd pain and n/v  Duration: 1 Days  Timing:  Constant  Location: Diffuse abd  Modifying Factors: None  Associated Symptoms: Diarrhea, diaphoresis    There are no other complaints, changes, or physical findings at this time.     Social Hx: + Tobacco, Former EtOH, +Illicit Drug Use    PCP: Maria Guadalupe Soni NP    Current Facility-Administered Medications   Medication Dose Route Frequency Provider Last Rate Last Dose    sodium chloride (NS) flush 5-10 mL  5-10 mL IntraVENous [address]        sodium chloride (NS) flush 5-10 mL  5-10 mL IntraVENous PRN [place], [place]        sodium chloride 0.9 % bolus infusion 1,000 mL  1,000 mL IntraVENous ONCE [place], [place] 1,000 mL/hr at 05/23/18 2019 1,000 mL at 05/23/18 2019    methocarbamol (ROBAXIN) tablet 500 mg  500 mg Oral NOW Onur Ramos MD         Current Outpatient Prescriptions   Medication Sig Dispense Refill    cyclobenzaprine (FLEXERIL) 5 mg tablet Take 1 Tab by mouth three (3) times daily as needed for Muscle Spasm(s). 20 Tab 0    ondansetron hcl (ZOFRAN) 4 mg tablet Take 1 Tab by mouth every eight (8) hours as needed for Nausea. 20 Tab 0    cloNIDine HCl (CATAPRES) 0.1 mg tablet Take 1 Tab by mouth three (3) times daily. 20 Tab 0    promethazine (PHENERGAN) 25 mg tablet Take 1 Tab by mouth every six (6) hours as needed. 12 Tab 0    TOPIRAMATE (TOPAMAX PO) Take 50 mg by mouth two (2) times a day.  buprenorphine-naloxone (SUBOXONE) 8-2 mg film sublingaul film by SubLINGual route daily.  linaclotide (LINZESS) 145 mcg cap capsule Take 1 Cap by mouth Daily (before breakfast). 30 Cap 0    docusate sodium 100 mg tab Take 1 Tab by mouth two (2) times a day. Indications: Constipation 20 Tab 0    PARoxetine CR (PAXIL-CR) 25 mg tablet Take 1 Tab by mouth daily.  30 Tab 1       Past History     Past Medical History:  Past Medical History:   Diagnosis Date    Asthma     Depression     Gastrointestinal disorder     abdominal pain    Gastroparesis     GERD (gastroesophageal reflux disease)     Heroin abuse     History of IBS     saw Dr. Marcelina Talley Ill-defined condition     ADHD    Psychiatric disorder     Depression    Seizures [facility])     age 7 y/o - had sz x 2 - was seen by Dr. Darrall Kocher - another sz age 15 y/o, another agoe 15 y/o       Past Surgical History:  Past Surgical History:   Procedure Laterality Date    HX ORTHOPAEDIC      right arm fx.  HX OTHER SURGICAL  6/2/15    ESOPHAGOGASTRODUODENAL (EGD) BIOPSY    UPPER GI ENDOSCOPY,BIOPSY  6/2/2015            Family History:  Family History   Problem Relation Age of Onset    Anxiety Mother     Anxiety Father     Cancer Maternal Grandfather      bladder     Heart Disease Maternal Grandfather     Diabetes Maternal Grandfather        Social History:  Social History   Substance Use Topics    Smoking status: Current Every Day Smoker     Packs/day: 1.00     Types: Cigarettes    Smokeless tobacco: Never Used    Alcohol use No      Comment: 6 months        Allergies: Allergies   Allergen Reactions    Amoxicillin Hives    Ceclor [Cefaclor] Hives    Demerol [Meperidine] Hives     seizures    Levaquin [Levofloxacin] Hives    Lorazepam Other (comments)     A/V hallucinations     Morphine Hives    Tramadol Hives     seizures    Zoloft [Sertraline] Other (comments)     ED         Review of Systems   Review of Systems   Constitutional: Positive for diaphoresis. Negative for chills and fever. Respiratory: Negative for cough and shortness of breath. Cardiovascular: Negative for chest pain. Gastrointestinal: Positive for abdominal pain, diarrhea, nausea and vomiting. Negative for constipation. Neurological: Negative for weakness and numbness. All other systems reviewed and are negative. Physical Exam   Physical Exam   Constitutional: He is oriented to person, place, and time. He appears well-developed and well-nourished. He appears distressed (Acute due to nausea). Has bucket with emesis in his lap. HENT:   Head: Normocephalic and atraumatic. Eyes: Conjunctivae and EOM are normal.   Neck: Normal range of motion. Neck supple. Cardiovascular: Normal rate and regular rhythm.     Pulmonary/Chest: Effort normal and breath sounds normal. No respiratory distress. Abdominal: Soft. He exhibits no distension. There is tenderness. Diffuse tender abd   Musculoskeletal: Normal range of motion. Neurological: He is alert and oriented to person, place, and time. Skin: Skin is warm and dry. Psychiatric: He has a normal mood and affect. Nursing note and vitals reviewed. Diagnostic Study Results     Labs -     Recent Results (from the past 12 hour(s))   DRUG SCREEN, URINE    Collection Time: 05/23/18  7:12 PM   Result Value Ref Range    AMPHETAMINES POSITIVE (A) NEG      BARBITURATES NEGATIVE  NEG      BENZODIAZEPINES NEGATIVE  NEG      COCAINE NEGATIVE  NEG      METHADONE POSITIVE (A) NEG      OPIATES NEGATIVE  NEG      PCP(PHENCYCLIDINE) NEGATIVE  NEG      THC (TH-CANNABINOL) NEGATIVE  NEG      Drug screen comment (NOTE)    CBC WITH AUTOMATED DIFF    Collection Time: 05/23/18  7:29 PM   Result Value Ref Range    WBC 12.1 (H) 4.1 - 11.1 K/uL    RBC 4.94 4.10 - 5.70 M/uL    HGB 14.8 12.1 - 17.0 g/dL    HCT 42.3 36.6 - 50.3 %    MCV 85.6 80.0 - 99.0 FL    MCH 30.0 26.0 - 34.0 PG    MCHC 35.0 30.0 - 36.5 g/dL    RDW 11.9 11.5 - 14.5 %    PLATELET 798 523 - 684 K/uL    MPV 10.1 8.9 - 12.9 FL    NRBC 0.0 0  WBC    ABSOLUTE NRBC 0.00 0.00 - 0.01 K/uL    NEUTROPHILS 71 32 - 75 %    LYMPHOCYTES 22 12 - 49 %    MONOCYTES 6 5 - 13 %    EOSINOPHILS 1 0 - 7 %    BASOPHILS 0 0 - 1 %    IMMATURE GRANULOCYTES 0 0.0 - 0.5 %    ABS. NEUTROPHILS 8.5 (H) 1.8 - 8.0 K/UL    ABS. LYMPHOCYTES 2.7 0.8 - 3.5 K/UL    ABS. MONOCYTES 0.8 0.0 - 1.0 K/UL    ABS. EOSINOPHILS 0.1 0.0 - 0.4 K/UL    ABS. BASOPHILS 0.0 0.0 - 0.1 K/UL    ABS. IMM.  GRANS. 0.0 0.00 - 0.04 K/UL    DF AUTOMATED     METABOLIC PANEL, COMPREHENSIVE    Collection Time: 05/23/18  7:29 PM   Result Value Ref Range    Sodium 139 136 - 145 mmol/L    Potassium 3.8 3.5 - 5.1 mmol/L    Chloride 104 97 - 108 mmol/L    CO2 26 21 - 32 mmol/L    Anion gap 9 5 - 15 mmol/L    Glucose 115 (H) 65 - 100 mg/dL    BUN 15 6 - 20 MG/DL    Creatinine 1.06 0.70 - 1.30 MG/DL    BUN/Creatinine ratio 14 12 - 20      GFR est AA >60 >60 ml/min/1.73m2    GFR est non-AA >60 >60 ml/min/1.73m2    Calcium 10.5 (H) 8.5 - 10.1 MG/DL    Bilirubin, total 0.7 0.2 - 1.0 MG/DL    ALT (SGPT) 25 12 - 78 U/L    AST (SGOT) 11 (L) 15 - 37 U/L    Alk. phosphatase 80 45 - 117 U/L    Protein, total 8.3 (H) 6.4 - 8.2 g/dL    Albumin 4.5 3.5 - 5.0 g/dL    Globulin 3.8 2.0 - 4.0 g/dL    A-G Ratio 1.2 1.1 - 2.2     LIPASE    Collection Time: 05/23/18  7:29 PM   Result Value Ref Range    Lipase 102 73 - 393 U/L       Radiologic Studies -   No orders to display     CT Results  (Last 48 hours)    None        CXR Results  (Last 48 hours)    None            Medical Decision Making   I am the first provider for this patient. I reviewed the vital signs, available nursing notes, past medical history, past surgical history, family history and social history. Vital Signs-Reviewed the patient's vital signs. Patient Vitals for the past 12 hrs:   Temp Pulse Resp BP SpO2   05/23/18 1942 - 62 - (!) 147/92 -   05/23/18 1836 98.2 °F (36.8 °C) 89 18 122/88 95 %       Pulse Oximetry Analysis - 95% on RA    Records Reviewed: Nursing Notes, Old Medical Records, Previous Radiology Studies and Previous Laboratory Studies    Provider Notes (Medical Decision Making):   Patient presents with nausea and vomiting. Most likely opioid withdrawal given story. DDx includes gastritis, pancreatitis, substance use, gastroenteritis, colitis. Will get labs and treat symptoms. ED Course:   Initial assessment performed. The patients presenting problems have been discussed, and they are in agreement with the care plan formulated and outlined with them. I have encouraged them to ask questions as they arise throughout their visit. Progress Notes:  8:36 PM    Spoke with pt about lab results. Pt reports he still feels restless, but nausea is better.  Pt asked for Lorazepam, but declined due to pt being in withdrawal from narcotics. Will give dose of Robaxin to help with muscle spasms. D/c home with Zofran (take before Topomax), Clonidine, and Robaxin. Critical Care Time:   0 minutes    Disposition:  Discharge Note:  8:27 PM  The patient has been re-evaluated and is ready for discharge. Reviewed available results with patient. Counseled patient/parent/guardian on diagnosis and care plan. Patient has expressed understanding, and all questions have been answered. Patient agrees with plan and agrees to follow up as recommended, or return to the ED if their symptoms worsen. Discharge instructions have been provided and explained to the patient, along with reasons to return to the ED. PLAN:  1. Current Discharge Medication List      START taking these medications    Details   cyclobenzaprine (FLEXERIL) 5 mg tablet Take 1 Tab by mouth three (3) times daily as needed for Muscle Spasm(s). Qty: 20 Tab, Refills: 0      ondansetron hcl (ZOFRAN) 4 mg tablet Take 1 Tab by mouth every eight (8) hours as needed for Nausea. Qty: 20 Tab, Refills: 0      cloNIDine HCl (CATAPRES) 0.1 mg tablet Take 1 Tab by mouth three (3) times daily. Qty: 20 Tab, Refills: 0           2. Follow-up Information     Follow up With Details Comments [address] Rajesh, NP  As needed [address]  [address] [place] [address]  [phone]          Return to ED if worse     Diagnosis     Clinical Impression:   1. Opioid dependence with withdrawal ([facility] [address]    2. Non-intractable vomiting with nausea, unspecified vomiting type        Attestations: This note is prepared by Ramon Diana, acting as scribe for MD Antonia Monique MD: The scribe's documentation has been prepared under my direction and personally reviewed by me in its entirety.  I confirm that the note above accurately reflects all work, treatment, procedures, and medical decision making performed by me.

## 2018-05-24 NOTE — DISCHARGE INSTRUCTIONS
Substance Abuse and Addiction Resources    [facility]  [phone]  · Closed meeting- family members that have been affected bysomeone alcohol abuse  · Open meeting everyone can attend. Alcoholic's Anonymous [phone]  · Non professional (alcoholics in recovery helping others)  · Hold Meetings (talk about sobriety, have desire)  · No charge    [facility] [phone]  · Afua Carlin is the Treatment Consultant in the [facility]  · Free one-on-one phone consultation and insurance verification  · 12 step based meetings and philosophy  · Family programs and sessions    [facility] [phone]  · Free individual assessment  · Intensive outpatient outpatient program  · Ambulatory withdrawal management/detoxification  · Insurance required    [facility] [phone] ext [phone] or [phone]  · For patients who are homeless, getting ready to be homeless or with no insurance  · Sliding fee scale available for self pay  · Patients go Monday-Friday from 8-4:30 to central intake for a shelter and then to [facility] for services  · Offers a variety of services I.e. Laundry, shower, eye clinic, medical clinic, dental, substance abuse services, case management, etc.    [facility] [phone]  · Make appointment with counselor  · $87 fee for initial hour intake    [address] [phone]  · Offers Detox and Inpatient Treatment for men only. Social detox  · Is a homeless shelter with longterm 12 step program. Can choose just access to the hshelter component  · Must be able to walk <1miles one way to attend classes, be highly motivated and willing to complete all 12 steps. 8 months- 1 year is the typical length of stay if accepted    [facility] [place] [phone]  · For residents of [facility]  · They offer outpatient treatment and can make referrals for inpatient careBased on income. · Will bill insurance.  Accept Medicaid. · They have a walk in clinic that patients can go to be screened for services. Two on the [facility] side of [facility]:  ·  [place] (near [facility]). Walk in hours: Mon or Wed       12:30pm - 3pm  ·  [facility]. Walk in hours: Tuesday 12:30pm 3pm Wed       8:30am- 11am    [facility]  · $3500 entry fee  · 12 step meeting plan  · No sex offenders accepted. · Must get a job within 30 days after admission  · After the 12 week, additional $125/week to stay    Narcotic Anonymous [phone], [phone]  · Will refer to state funded facility    [facility]  · Walk into Triage (takes 10-15 minutes)  · Proof of [facility] residence with Picture ID  · Medicaid and Self Pay. NO Private insurance    [facility] [phone]  · Referrals come from organizations like [facility], [facility], [facility]. · Must be self pay. No insurance allowed. · No patients on prescribed narcotics. No sex offenders. · Need all medical mental health information and medication list sent prior to admit. [facility] [phone]  · [facility] [facility] Ext between 21-65  · Need social security card and picture ID  · Must pass breath test and 10 panel Urine Drug Screen  · No Sex Offenders or Psychiatric patients  · Must not have been a 3x repeat at this facility  · 6 month in house- has to participate in work therapy 40 hours/week  · Participates in spiritual classes, bible study and Zoroastrian    [facility] [phone]  · Private Pay, sent by [employer]  · No Sex Offenders              [facility] (by [address])    [facility]  [phone]  · Monday through Friday 8:30am to 5:00pm  · Brief Telephone Interview. Then will be scheduled for next substance abuse services orientation group and then scheduled for intake interview.     [facility]  [phone]  · Walk in Monday through Friday 9:00AM to 3:00PM  · Bring Picture ID, Proof of residence (piece of mail), Proof of Insurance ([address] scale), Proof of income (any)    [facility] [phone]  · Walk in then Assessment by licensed professional   · [place] office ([address] [facility]) Monday, Tuesday, Thursday  9AM to 3PM.   · [facility] office ([address] [place] [address], [address] [facility]) [facility] [phone]  · Walk In Monday to Friday starting at [address]  · Bring Picture ID, Proof of residence (piece of mail), Proof of insurance (Medicaid/sliding scale)  · At 9AM is the Substance Abuse Services Orientation Group and then scheduled for Intake Evaluation.

## 2018-05-31 ENCOUNTER — HOSPITAL ENCOUNTER (EMERGENCY)
Age: 28
Discharge: HOME | End: 2018-05-31
Attending: EMERGENCY MEDICINE
Payer: MEDICAID

## 2018-05-31 VITALS
DIASTOLIC BLOOD PRESSURE: 87 MMHG | HEART RATE: 123 BPM | SYSTOLIC BLOOD PRESSURE: 115 MMHG | WEIGHT: 167.33 LBS | BODY MASS INDEX: 26.26 KG/M2 | HEIGHT: 67 IN | OXYGEN SATURATION: 100 % | TEMPERATURE: 98.1 F | RESPIRATION RATE: 16 BRPM

## 2018-05-31 DIAGNOSIS — R11.2 NAUSEA AND VOMITING, INTRACTABILITY OF VOMITING NOT SPECIFIED, UNSPECIFIED VOMITING TYPE: ICD-10-CM

## 2018-05-31 DIAGNOSIS — R10.84 ABDOMINAL PAIN, GENERALIZED: Primary | ICD-10-CM

## 2018-05-31 LAB
ALBUMIN SERPL-MCNC: 4.4 G/DL (ref 3.5–5)
ALBUMIN/GLOB SERPL: 1.2 {RATIO} (ref 1.1–2.2)
ALP SERPL-CCNC: 88 U/L (ref 45–117)
ALT SERPL-CCNC: 47 U/L (ref 12–78)
ANION GAP SERPL CALC-SCNC: 8 MMOL/L (ref 5–15)
AST SERPL-CCNC: 29 U/L (ref 15–37)
BASOPHILS # BLD: 0.1 K/UL (ref 0–0.1)
BASOPHILS NFR BLD: 0 % (ref 0–1)
BILIRUB SERPL-MCNC: 0.4 MG/DL (ref 0.2–1)
BUN SERPL-MCNC: 20 MG/DL (ref 6–20)
BUN/CREAT SERPL: 20 (ref 12–20)
CALCIUM SERPL-MCNC: 9.7 MG/DL (ref 8.5–10.1)
CHLORIDE SERPL-SCNC: 105 MMOL/L (ref 97–108)
CO2 SERPL-SCNC: 25 MMOL/L (ref 21–32)
CREAT SERPL-MCNC: 1 MG/DL (ref 0.7–1.3)
DIFFERENTIAL METHOD BLD: ABNORMAL
EOSINOPHIL # BLD: 0.1 K/UL (ref 0–0.4)
EOSINOPHIL NFR BLD: 1 % (ref 0–7)
ERYTHROCYTE [DISTWIDTH] IN BLOOD BY AUTOMATED COUNT: 12 % (ref 11.5–14.5)
GLOBULIN SER CALC-MCNC: 3.8 G/DL (ref 2–4)
GLUCOSE SERPL-MCNC: 94 MG/DL (ref 65–100)
HCT VFR BLD AUTO: 44.5 % (ref 36.6–50.3)
HGB BLD-MCNC: 15.5 G/DL (ref 12.1–17)
IMM GRANULOCYTES # BLD: 0 K/UL (ref 0–0.04)
IMM GRANULOCYTES NFR BLD AUTO: 0 % (ref 0–0.5)
LIPASE SERPL-CCNC: 142 U/L (ref 73–393)
LYMPHOCYTES # BLD: 5.8 K/UL (ref 0.8–3.5)
LYMPHOCYTES NFR BLD: 42 % (ref 12–49)
MAGNESIUM SERPL-MCNC: 2.3 MG/DL (ref 1.6–2.4)
MCH RBC QN AUTO: 30.4 PG (ref 26–34)
MCHC RBC AUTO-ENTMCNC: 34.8 G/DL (ref 30–36.5)
MCV RBC AUTO: 87.3 FL (ref 80–99)
MONOCYTES # BLD: 0.8 K/UL (ref 0–1)
MONOCYTES NFR BLD: 6 % (ref 5–13)
NEUTS SEG # BLD: 7.1 K/UL (ref 1.8–8)
NEUTS SEG NFR BLD: 51 % (ref 32–75)
NRBC # BLD: 0 K/UL (ref 0–0.01)
NRBC BLD-RTO: 0 PER 100 WBC
PLATELET # BLD AUTO: 289 K/UL (ref 150–400)
PMV BLD AUTO: 10.4 FL (ref 8.9–12.9)
POTASSIUM SERPL-SCNC: 3.4 MMOL/L (ref 3.5–5.1)
PROT SERPL-MCNC: 8.2 G/DL (ref 6.4–8.2)
RBC # BLD AUTO: 5.1 M/UL (ref 4.1–5.7)
SODIUM SERPL-SCNC: 138 MMOL/L (ref 136–145)
WBC # BLD AUTO: 13.9 K/UL (ref 4.1–11.1)

## 2018-05-31 PROCEDURE — 83735 ASSAY OF MAGNESIUM: CPT | Performed by: EMERGENCY MEDICINE

## 2018-05-31 PROCEDURE — 83690 ASSAY OF LIPASE: CPT | Performed by: EMERGENCY MEDICINE

## 2018-05-31 PROCEDURE — 74011250637 HC RX REV CODE- 250/637: Performed by: EMERGENCY MEDICINE

## 2018-05-31 PROCEDURE — 74011000258 HC RX REV CODE- 258: Performed by: EMERGENCY MEDICINE

## 2018-05-31 PROCEDURE — 74011250636 HC RX REV CODE- 250/636: Performed by: EMERGENCY MEDICINE

## 2018-05-31 PROCEDURE — 74011000250 HC RX REV CODE- 250: Performed by: EMERGENCY MEDICINE

## 2018-05-31 PROCEDURE — 36415 COLL VENOUS BLD VENIPUNCTURE: CPT | Performed by: EMERGENCY MEDICINE

## 2018-05-31 PROCEDURE — 96375 TX/PRO/DX INJ NEW DRUG ADDON: CPT

## 2018-05-31 PROCEDURE — 96374 THER/PROPH/DIAG INJ IV PUSH: CPT

## 2018-05-31 PROCEDURE — 85025 COMPLETE CBC W/AUTO DIFF WBC: CPT | Performed by: EMERGENCY MEDICINE

## 2018-05-31 PROCEDURE — 80053 COMPREHEN METABOLIC PANEL: CPT | Performed by: EMERGENCY MEDICINE

## 2018-05-31 PROCEDURE — 99283 EMERGENCY DEPT VISIT LOW MDM: CPT

## 2018-05-31 RX ORDER — SODIUM CHLORIDE 0.9 % (FLUSH) 0.9 %
5-10 SYRINGE (ML) INJECTION AS NEEDED
Status: DISCONTINUED | OUTPATIENT
Start: 2018-05-31 | End: 2018-05-31 | Stop reason: HOSPADM

## 2018-05-31 RX ORDER — DICYCLOMINE HYDROCHLORIDE 10 MG/1
20 CAPSULE ORAL 4 TIMES DAILY
Qty: 20 CAP | Refills: 0 | Status: SHIPPED | OUTPATIENT
Start: 2018-05-31 | End: 2020-01-08

## 2018-05-31 RX ORDER — ONDANSETRON 2 MG/ML
4 INJECTION INTRAMUSCULAR; INTRAVENOUS
Status: COMPLETED | OUTPATIENT
Start: 2018-05-31 | End: 2018-05-31

## 2018-05-31 RX ORDER — KETOROLAC TROMETHAMINE 30 MG/ML
30 INJECTION, SOLUTION INTRAMUSCULAR; INTRAVENOUS
Status: COMPLETED | OUTPATIENT
Start: 2018-05-31 | End: 2018-05-31

## 2018-05-31 RX ORDER — SODIUM CHLORIDE 0.9 % (FLUSH) 0.9 %
5-10 SYRINGE (ML) INJECTION EVERY 8 HOURS
Status: DISCONTINUED | OUTPATIENT
Start: 2018-05-31 | End: 2018-05-31 | Stop reason: HOSPADM

## 2018-05-31 RX ORDER — ONDANSETRON 4 MG/1
4 TABLET, ORALLY DISINTEGRATING ORAL
Qty: 10 TAB | Refills: 0 | Status: SHIPPED | OUTPATIENT
Start: 2018-05-31 | End: 2020-01-08

## 2018-05-31 RX ADMIN — ONDANSETRON 4 MG: 2 INJECTION INTRAMUSCULAR; INTRAVENOUS at 17:27

## 2018-05-31 RX ADMIN — LIDOCAINE HYDROCHLORIDE 76 MG: 20 INJECTION, SOLUTION INTRAVENOUS at 18:37

## 2018-05-31 RX ADMIN — KETOROLAC TROMETHAMINE 30 MG: 30 INJECTION, SOLUTION INTRAMUSCULAR; INTRAVENOUS at 17:28

## 2018-05-31 RX ADMIN — Medication 10 ML: at 17:29

## 2018-05-31 RX ADMIN — LIDOCAINE HYDROCHLORIDE 40 ML: 20 SOLUTION ORAL; TOPICAL at 18:26

## 2018-05-31 RX ADMIN — SODIUM CHLORIDE 1000 ML: 900 INJECTION, SOLUTION INTRAVENOUS at 17:26

## 2018-05-31 NOTE — DISCHARGE INSTRUCTIONS
Abdominal Pain: Care Instructions  Your Care Instructions    Abdominal pain has many possible causes. Some aren't serious and get better on their own in a few days. Others need more testing and treatment. If your pain continues or gets worse, you need to be rechecked and may need more tests to find out what is wrong. You may need surgery to correct the problem. Don't ignore new symptoms, such as fever, nausea and vomiting, urination problems, pain that gets worse, and dizziness. These may be signs of a more serious problem. Your doctor may have recommended a follow-up visit in the next 8 to 12 hours. If you are not getting better, you may need more tests or treatment. The doctor has checked you carefully, but problems can develop later. If you notice any problems or new symptoms, get medical treatment right away. Follow-up care is a key part of your treatment and safety. Be sure to make and go to all appointments, and call your doctor if you are having problems. It's also a good idea to know your test results and keep a list of the medicines you take. How can you care for yourself at home? · Rest until you feel better. · To prevent dehydration, drink plenty of fluids, enough so that your urine is light yellow or clear like water. Choose water and other caffeine-free clear liquids until you feel better. If you have kidney, heart, or liver disease and have to limit fluids, talk with your doctor before you increase the amount of fluids you drink. · If your stomach is upset, eat mild foods, such as rice, dry toast or crackers, bananas, and applesauce. Try eating several small meals instead of two or three large ones. · Wait until 48 hours after all symptoms have gone away before you have spicy foods, alcohol, and drinks that contain caffeine. · Do not eat foods that are high in fat. · Avoid anti-inflammatory medicines such as aspirin, ibuprofen (Advil, Motrin), and naproxen (Aleve).  These can cause stomach upset. Talk to your doctor if you take daily aspirin for another health problem. When should you call for help? Call 911 anytime you think you may need emergency care. For example, call if:  ? · You passed out (lost consciousness). ? · You pass maroon or very bloody stools. ? · You vomit blood or what looks like coffee grounds. ? · You have new, severe belly pain. ?Call your doctor now or seek immediate medical care if:  ? · Your pain gets worse, especially if it becomes focused in one area of your belly. ? · You have a new or higher fever. ? · Your stools are black and look like tar, or they have streaks of blood. ? · You have unexpected vaginal bleeding. ? · You have symptoms of a urinary tract infection. These may include:  ¨ Pain when you urinate. ¨ Urinating more often than usual.  ¨ Blood in your urine. ? · You are dizzy or lightheaded, or you feel like you may faint. ? Watch closely for changes in your health, and be sure to contact your doctor if:  ? · You are not getting better after 1 day (24 hours). Where can you learn more? Go to http://zhane-francis.info/. Enter A220 in the search box to learn more about \"Abdominal Pain: Care Instructions. \"  Current as of: March 20, 2017  Content Version: 11.4  © 1678-9022 [employer]. Care instructions adapted under license by [employer] (which disclaims liability or warranty for this information). If you have questions about a medical condition or this instruction, always ask your healthcare professional. [place] [address] any warranty or liability for your use of this information.

## 2018-05-31 NOTE — ED TRIAGE NOTES
Assumed care of this patient. He is alert and oriented x4. Patient on monitor x2. He reports that he has been experiencing abd pain, nausea and diarrhea x2 days.

## 2018-06-01 ENCOUNTER — HOSPITAL ENCOUNTER (EMERGENCY)
Age: 28
Discharge: HOME | End: 2018-06-01
Attending: EMERGENCY MEDICINE
Payer: MEDICAID

## 2018-06-01 VITALS
WEIGHT: 180 LBS | HEART RATE: 84 BPM | RESPIRATION RATE: 17 BRPM | TEMPERATURE: 98.2 F | HEIGHT: 67 IN | OXYGEN SATURATION: 99 % | SYSTOLIC BLOOD PRESSURE: 122 MMHG | DIASTOLIC BLOOD PRESSURE: 80 MMHG | BODY MASS INDEX: 28.25 KG/M2

## 2018-06-01 DIAGNOSIS — G24.02 DYSTONIC DRUG REACTION: Primary | ICD-10-CM

## 2018-06-01 PROCEDURE — 96375 TX/PRO/DX INJ NEW DRUG ADDON: CPT

## 2018-06-01 PROCEDURE — 99285 EMERGENCY DEPT VISIT HI MDM: CPT

## 2018-06-01 PROCEDURE — 96374 THER/PROPH/DIAG INJ IV PUSH: CPT

## 2018-06-01 PROCEDURE — 74011250636 HC RX REV CODE- 250/636: Performed by: EMERGENCY MEDICINE

## 2018-06-01 RX ORDER — DIPHENHYDRAMINE HYDROCHLORIDE 50 MG/ML
25 INJECTION, SOLUTION INTRAMUSCULAR; INTRAVENOUS
Status: COMPLETED | OUTPATIENT
Start: 2018-06-01 | End: 2018-06-01

## 2018-06-01 RX ORDER — DEXAMETHASONE SODIUM PHOSPHATE 4 MG/ML
10 INJECTION, SOLUTION INTRA-ARTICULAR; INTRALESIONAL; INTRAMUSCULAR; INTRAVENOUS; SOFT TISSUE
Status: COMPLETED | OUTPATIENT
Start: 2018-06-01 | End: 2018-06-01

## 2018-06-01 RX ADMIN — DEXAMETHASONE SODIUM PHOSPHATE 10 MG: 4 INJECTION, SOLUTION INTRAMUSCULAR; INTRAVENOUS at 21:15

## 2018-06-01 RX ADMIN — DIPHENHYDRAMINE HYDROCHLORIDE 25 MG: 50 INJECTION, SOLUTION INTRAMUSCULAR; INTRAVENOUS at 21:15

## 2018-06-01 NOTE — ED PROVIDER NOTES
EMERGENCY DEPARTMENT HISTORY AND PHYSICAL EXAM      Date: 5/31/2018  Patient Name: Adenike Jackson    History of Presenting Illness     Chief Complaint   Patient presents with    Abdominal Pain     Pt ambulatory to triage with c/o generalized abdominal pain, N/V x 2 days; unable to tolerate PO solids, liquids    Vomiting     x 2 days; had x1 episode diarrhea x 2 days ago       History Provided By: Patient    HPI: Adenike Jackson, 32 y.o. male with PMHx significant for asthma, GERD, gastroparesis, presents ambulatory to the ED with cc of gradual worsening diffuse abdominal pain, with associated sxs of nausea, vomiting, and diaphoresis, that has been gradual for several days. Pt reports severity of abdominal pain as sharp and 8-10. Pt reports abdominal pain is \"killing me. \"  Pt reports hot baths relieve pain, but eating and drinking exacerbate him. He specifically denies any fevers, chills, chest pain, shortness of breath, headache, rash, diarrhea,or weight loss. Chief Complaint: abdominal pain  Duration: several Days  Timing:  Gradual and Worsening  Location: diffuse abdominal region   Quality: Sharp  Severity: 8 out of 10  Modifying Factors: N/A  Associated Symptoms: nausea, vomiting, and diaphoresis      There are no other complaints, changes, or physical findings at this time. PCP: Helen Valencia NP    Current Outpatient Prescriptions   Medication Sig Dispense Refill    dicyclomine (BENTYL) 10 mg capsule Take 2 Caps by mouth four (4) times daily. 20 Cap 0    ondansetron (ZOFRAN ODT) 4 mg disintegrating tablet Take 1 Tab by mouth every eight (8) hours as needed for Nausea. 10 Tab 0    ondansetron hcl (ZOFRAN) 4 mg tablet Take 1 Tab by mouth every eight (8) hours as needed for Nausea. 20 Tab 0    TOPIRAMATE (TOPAMAX PO) Take 50 mg by mouth two (2) times a day.  PARoxetine CR (PAXIL-CR) 25 mg tablet Take 1 Tab by mouth daily.  30 Tab 1       Past History     Past Medical History:  Past Medical History:   Diagnosis Date    Asthma     Depression     Gastrointestinal disorder     abdominal pain    Gastroparesis     GERD (gastroesophageal reflux disease)     Heroin abuse     History of IBS     saw Dr. Vazquez Anand Ill-defined condition     ADHD    Psychiatric disorder     Depression    Seizures [facility])     age 9 y/o - had sz x 2 - was seen by Dr. Owen Landeros - another sz age 15 y/o, another agoe 15 y/o       Past Surgical History:  Past Surgical History:   Procedure Laterality Date    HX ORTHOPAEDIC      right arm fx.  HX OTHER SURGICAL  6/2/15    ESOPHAGOGASTRODUODENAL (EGD) BIOPSY    UPPER GI ENDOSCOPY,BIOPSY  6/2/2015            Family History:  Family History   Problem Relation Age of Onset    Anxiety Mother     Anxiety Father     Cancer Maternal Grandfather      bladder     Heart Disease Maternal Grandfather     Diabetes Maternal Grandfather        Social History:  Social History   Substance Use Topics    Smoking status: Current Every Day Smoker     Packs/day: 1.00     Types: Cigarettes    Smokeless tobacco: Never Used    Alcohol use No      Comment: 6 months        Allergies: Allergies   Allergen Reactions    Amoxicillin Hives    Ceclor [Cefaclor] Hives    Demerol [Meperidine] Hives     seizures    Levaquin [Levofloxacin] Hives    Lorazepam Other (comments)     A/V hallucinations     Morphine Hives    Tramadol Hives     seizures    Zoloft [Sertraline] Other (comments)     ED         Review of Systems   Review of Systems   Constitutional: Positive for diaphoresis. Negative for activity change, appetite change, chills, fatigue, fever and unexpected weight change. HENT: Negative. Negative for congestion, hearing loss, rhinorrhea, sneezing and voice change. Eyes: Negative. Negative for pain and visual disturbance. Respiratory: Negative. Negative for apnea, cough, choking, chest tightness and shortness of breath. Cardiovascular: Negative.   Negative for chest pain and palpitations. Gastrointestinal: Positive for abdominal pain, nausea and vomiting. Negative for abdominal distention, blood in stool and diarrhea. Genitourinary: Negative. Negative for difficulty urinating, flank pain, frequency and urgency. No discharge   Musculoskeletal: Negative. Negative for arthralgias, back pain, myalgias and neck stiffness. Skin: Negative. Negative for color change and rash. Neurological: Negative. Negative for dizziness, seizures, syncope, speech difficulty, weakness, numbness and headaches. Hematological: Negative for adenopathy. Psychiatric/Behavioral: Negative. Negative for agitation, behavioral problems, dysphoric mood and suicidal ideas. The patient is not nervous/anxious. Physical Exam   Physical Exam   Constitutional: He is oriented to person, place, and time. He appears well-developed and well-nourished. No distress. HENT:   Head: Normocephalic and atraumatic. Mouth/Throat: Oropharynx is clear and moist. No oropharyngeal exudate. Eyes: Conjunctivae and EOM are normal. Pupils are equal, round, and reactive to light. Right eye exhibits no discharge. Left eye exhibits no discharge. Neck: Normal range of motion. Neck supple. Cardiovascular: Normal rate, regular rhythm and intact distal pulses. Exam reveals no gallop and no friction rub. No murmur heard. Pulmonary/Chest: Effort normal and breath sounds normal. No respiratory distress. He has no wheezes. He has no rales. He exhibits no tenderness. Abdominal: Soft. Bowel sounds are normal. He exhibits no distension and no mass. There is no tenderness. There is no rebound and no guarding. Mild diffuse abdominal tenderness   Musculoskeletal: Normal range of motion. He exhibits no edema. Lymphadenopathy:     He has no cervical adenopathy. Neurological: He is alert and oriented to person, place, and time. No cranial nerve deficit. Coordination normal.   Skin: Skin is warm and dry.  No rash noted. No erythema. Psychiatric: He has a normal mood and affect. Nursing note and vitals reviewed. Diagnostic Study Results     Labs -     Recent Results (from the past 12 hour(s))   CBC WITH AUTOMATED DIFF    Collection Time: 05/31/18  5:16 PM   Result Value Ref Range    WBC 13.9 (H) 4.1 - 11.1 K/uL    RBC 5.10 4. 10 - 5.70 M/uL    HGB 15.5 12.1 - 17.0 g/dL    HCT 44.5 36.6 - 50.3 %    MCV 87.3 80.0 - 99.0 FL    MCH 30.4 26.0 - 34.0 PG    MCHC 34.8 30.0 - 36.5 g/dL    RDW 12.0 11.5 - 14.5 %    PLATELET 708 089 - 371 K/uL    MPV 10.4 8.9 - 12.9 FL    NRBC 0.0 0  WBC    ABSOLUTE NRBC 0.00 0.00 - 0.01 K/uL    NEUTROPHILS 51 32 - 75 %    LYMPHOCYTES 42 12 - 49 %    MONOCYTES 6 5 - 13 %    EOSINOPHILS 1 0 - 7 %    BASOPHILS 0 0 - 1 %    IMMATURE GRANULOCYTES 0 0.0 - 0.5 %    ABS. NEUTROPHILS 7.1 1.8 - 8.0 K/UL    ABS. LYMPHOCYTES 5.8 (H) 0.8 - 3.5 K/UL    ABS. MONOCYTES 0.8 0.0 - 1.0 K/UL    ABS. EOSINOPHILS 0.1 0.0 - 0.4 K/UL    ABS. BASOPHILS 0.1 0.0 - 0.1 K/UL    ABS. IMM. GRANS. 0.0 0.00 - 0.04 K/UL    DF AUTOMATED     METABOLIC PANEL, COMPREHENSIVE    Collection Time: 05/31/18  5:16 PM   Result Value Ref Range    Sodium 138 136 - 145 mmol/L    Potassium 3.4 (L) 3.5 - 5.1 mmol/L    Chloride 105 97 - 108 mmol/L    CO2 25 21 - 32 mmol/L    Anion gap 8 5 - 15 mmol/L    Glucose 94 65 - 100 mg/dL    BUN 20 6 - 20 MG/DL    Creatinine 1.00 0.70 - 1.30 MG/DL    BUN/Creatinine ratio 20 12 - 20      GFR est AA >60 >60 ml/min/1.73m2    GFR est non-AA >60 >60 ml/min/1.73m2    Calcium 9.7 8.5 - 10.1 MG/DL    Bilirubin, total 0.4 0.2 - 1.0 MG/DL    ALT (SGPT) 47 12 - 78 U/L    AST (SGOT) 29 15 - 37 U/L    Alk.  phosphatase 88 45 - 117 U/L    Protein, total 8.2 6.4 - 8.2 g/dL    Albumin 4.4 3.5 - 5.0 g/dL    Globulin 3.8 2.0 - 4.0 g/dL    A-G Ratio 1.2 1.1 - 2.2     LIPASE    Collection Time: 05/31/18  5:16 PM   Result Value Ref Range    Lipase 142 73 - 393 U/L   MAGNESIUM    Collection Time: 05/31/18  5:16 PM Result Value Ref Range    Magnesium 2.3 1.6 - 2.4 mg/dL       Radiologic Studies -   No orders to display     CT Results  (Last 48 hours)    None        CXR Results  (Last 48 hours)    None            Medical Decision Making   I am the first provider for this patient. I reviewed the vital signs, available nursing notes, past medical history, past surgical history, family history and social history. Vital Signs-Reviewed the patient's vital signs. Patient Vitals for the past 12 hrs:   Temp Pulse Resp BP SpO2   05/31/18 1718 - - - 115/87 100 %   05/31/18 1646 98.1 °F (36.7 °C) (!) 123 16 118/83 100 %       Pulse Oximetry Analysis - 100% on RA    Cardiac Monitor:   Rate: 123 bpm  Rhythm: Normal Sinus Rhythm      EKG interpretation: (Preliminary)    Records Reviewed: Nursing Notes and Old Medical Records    Provider Notes (Medical Decision Making):   DDx: chronic pain, irritable bowel, constipation    ED Course:   Initial assessment performed. The patients presenting problems have been discussed, and they are in agreement with the care plan formulated and outlined with them. I have encouraged them to ask questions as they arise throughout their visit. 7:13 PM  The patient states that their symptoms have resolved and they feel much better. There are no other new complaints at this time. His questions have been answered. We are awaiting final results and those will be reviewed with them when they become available. Critical Care Time:   0    Disposition:  Discharge Note:  7:13 PM  The pt is ready for discharge. The pt's signs, symptoms, diagnosis, and discharge instructions have been discussed and pt has conveyed their understanding. The pt is to follow up as recommended or return to ER should their symptoms worsen. Plan has been discussed and pt is in agreement. PLAN:  1.    Discharge Medication List as of 5/31/2018  7:15 PM      START taking these medications    Details   dicyclomine (BENTYL) 10 mg capsule Take 2 Caps by mouth four (4) times daily. , Normal, Disp-20 Cap, R-0      ondansetron (ZOFRAN ODT) 4 mg disintegrating tablet Take 1 Tab by mouth every eight (8) hours as needed for Nausea., Normal, Disp-10 Tab, R-0         CONTINUE these medications which have NOT CHANGED    Details   ondansetron hcl (ZOFRAN) 4 mg tablet Take 1 Tab by mouth every eight (8) hours as needed for Nausea. , Print, Disp-20 Tab, R-0      TOPIRAMATE (TOPAMAX PO) Take 50 mg by mouth two (2) times a day., Historical Med      PARoxetine CR (PAXIL-CR) 25 mg tablet Take 1 Tab by mouth daily. , Normal, Disp-30 Tab, R-1           2. Follow-up Information     Follow up With Details Comments [address], NP Call in 2 days  [facility]   [facility] [id number]      Kieran Kang MD Call in 2 days As needed, If symptoms worsen Nena Upton  [place] [address]  [phone]          Return to ED if worse     Diagnosis     Clinical Impression:   1. Abdominal pain, generalized    2. Nausea and vomiting, intractability of vomiting not specified, unspecified vomiting type        Attestations: This note is prepared by Nalini Garcia, acting as Scribe for [employer] Bryant Mueller, [facility] Bryant Mueller MD: The scribe's documentation has been prepared under my direction and personally reviewed by me in its entirety. I confirm that the note above accurately reflects all work, treatment, procedures, and medical decision making performed by me.

## 2018-06-01 NOTE — ED NOTES
Assumed care of patient. Patient arrives from home via EMS with chief complaint of allergic reaction. Patient was seen at [facility] earlier today for excessive nausea and vomiting. Patient states she he received a GI cocktail, Zofran, Reglan, Fentanyl, and Haldol. Patient states he has received all of these medications before, with no complications,  except for Haldol. Patient states shortly after coming home from [facility], he began to experience swelling of his tongue, lock jaw, and shortness of breath. Family states patient was 80% on at-home pulse oximeter, EMS reports patient's O2 sats were 99% on 10L/min facemask, which was placed for comfort. Patient received fluids and 50mg of Benadryl en route and reports improvement in his symptoms. Patient is alert and oriented x4, respirations even and unlabored. Monitor x3, call bell within reach.

## 2018-06-02 NOTE — ED PROVIDER NOTES
EMERGENCY DEPARTMENT HISTORY AND PHYSICAL EXAM      Date: 6/1/2018  Patient Name: Michelle Hernandez    History of Presenting Illness     Chief Complaint   Patient presents with    Allergic Reaction       History Provided By: Patient    HPI: Michelle Hernandez, 32 y.o. male with PMHx significant for asthma, GERD, IBS, gastroparesis, presents via EMS to the ED with cc of a possible allergic reaction. Pt states he was seen at [facility] earlier today for abdominal pain with nausea and vomiting and given Haldol, GI cocktail, Zofran, Reglan, and Fentanyl and discharged home. He reports that after shortly arriving home around 3:30 he began feeling SOB, swelling and protrusion of his tongue, and lockjaw. EMS had given him Benadryl, which he notes helped alleviate his symptoms. Pt also notes that last time he was given Haldol, it \"made my back protrude\". He denies any pruritis, but does report some redness on his forearms bilaterally. There are no other complaints, changes, or physical findings at this time. PCP: Nate King NP    Current Outpatient Prescriptions   Medication Sig Dispense Refill    dicyclomine (BENTYL) 10 mg capsule Take 2 Caps by mouth four (4) times daily. 20 Cap 0    ondansetron (ZOFRAN ODT) 4 mg disintegrating tablet Take 1 Tab by mouth every eight (8) hours as needed for Nausea. 10 Tab 0    ondansetron hcl (ZOFRAN) 4 mg tablet Take 1 Tab by mouth every eight (8) hours as needed for Nausea. 20 Tab 0    TOPIRAMATE (TOPAMAX PO) Take 50 mg by mouth two (2) times a day.  PARoxetine CR (PAXIL-CR) 25 mg tablet Take 1 Tab by mouth daily.  30 Tab 1       Past History     Past Medical History:  Past Medical History:   Diagnosis Date    Asthma     Depression     Gastrointestinal disorder     abdominal pain    Gastroparesis     GERD (gastroesophageal reflux disease)     Heroin abuse     History of IBS     saw Dr. Fidencio Demarco Ill-defined condition     ADHD    Psychiatric disorder     Depression  Seizures [facility])     age 9 y/o - had sz x 2 - was seen by Dr. Pao Page - another sz age 15 y/o, another agoe 15 y/o       Past Surgical History:  Past Surgical History:   Procedure Laterality Date    HX ORTHOPAEDIC      right arm fx.  HX OTHER SURGICAL  6/2/15    ESOPHAGOGASTRODUODENAL (EGD) BIOPSY    UPPER GI ENDOSCOPY,BIOPSY  6/2/2015            Family History:  Family History   Problem Relation Age of Onset    Anxiety Mother     Anxiety Father     Cancer Maternal Grandfather      bladder     Heart Disease Maternal Grandfather     Diabetes Maternal Grandfather        Social History:  Social History   Substance Use Topics    Smoking status: Current Every Day Smoker     Packs/day: 1.00     Types: Cigarettes    Smokeless tobacco: Never Used    Alcohol use No      Comment: 6 months        Allergies: Allergies   Allergen Reactions    Amoxicillin Hives    Ceclor [Cefaclor] Hives    Demerol [Meperidine] Hives     seizures    Levaquin [Levofloxacin] Hives    Lorazepam Other (comments)     A/V hallucinations     Morphine Hives    Tramadol Hives     seizures    Zoloft [Sertraline] Other (comments)     ED         Review of Systems   Review of Systems   Constitutional: Negative for chills and fever. HENT: Positive for facial swelling (tongue). Negative for congestion. Positive for locked jaw   Eyes: Negative for visual disturbance. Respiratory: Positive for shortness of breath. Negative for chest tightness. Cardiovascular: Negative for chest pain and leg swelling. Gastrointestinal: Negative for abdominal pain and vomiting. Endocrine: Negative for polyuria. Genitourinary: Negative for dysuria and frequency. Musculoskeletal: Negative for myalgias. Skin: Negative for color change. Allergic/Immunologic: Negative for immunocompromised state. Neurological: Negative for numbness. Physical Exam   Physical Exam   Nursing note and vitals reviewed.   General appearance: non-toxic, NAD  Eyes: PERRL, EOMI, conjunctiva normal, anicteric sclera  HEENT: mucous membranes moist, oropharynx is clear, tongue is normal, uvula midline, posterior oropharynx is non-erythematous, floor of mouth is soft, dentition intact  Pulmonary: clear to auscultation bilaterally  Cardiac: normal rate and regular rhythm, no murmurs, gallops, or rubs, 2+DP pulses, 2+ radial pulses  Abdomen: soft, nontender, nondistended, bowel sounds present  MSK: no pre-tibial edema  Neuro: Alert, answers questions appropriately  Skin: capillary refill brisk, no urticaria    Medical Decision Making   I am the first provider for this patient. I reviewed the vital signs, available nursing notes, past medical history, past surgical history, family history and social history. Vital Signs-Reviewed the patient's vital signs. Patient Vitals for the past 12 hrs:   Temp Pulse Resp BP SpO2   06/01/18 2115 - 84 17 122/80 99 %   06/01/18 2100 - 94 18 129/87 98 %   06/01/18 2015 - 82 18 124/78 99 %   06/01/18 2000 - 80 21 123/79 100 %   06/01/18 1959 98.2 °F (36.8 °C) 78 20 122/81 99 %       Pulse Oximetry Analysis - 100% on room air    Cardiac Monitor:   Rate: 83 bpm  Rhythm: Normal Sinus Rhythm 126/80    Records Reviewed: Nursing Notes, Old Medical Records and Ambulance Run Sheet    Provider Notes (Medical Decision Making):   DDx: highly suggestive of dystonic episode as pt notes receiving Reglan and Haldol in proximity before d/c from [facility]. No signs of histamine mediated allergic reaction; given dose of decadron in event of more allergic phenomenon, but highly suspicious for dystonia. ED Course:   Initial assessment performed. The patients presenting problems have been discussed, and they are in agreement with the care plan formulated and outlined with them. I have encouraged them to ask questions as they arise throughout their visit. Disposition:  DISCHARGE NOTE  9:53 PM  The patient has been re-evaluated and is ready for discharge. Reviewed available results with patient. Counseled patient on diagnosis and care plan. Patient has expressed understanding, and all questions have been answered. Patient agrees with plan and agrees to follow up as recommended, or return to the ED if their symptoms worsen. Discharge instructions have been provided and explained to the patient, along with reasons to return to the ED. PLAN:  1. Current Discharge Medication List      CONTINUE these medications which have NOT CHANGED    Details   dicyclomine (BENTYL) 10 mg capsule Take 2 Caps by mouth four (4) times daily. Qty: 20 Cap, Refills: 0      ondansetron (ZOFRAN ODT) 4 mg disintegrating tablet Take 1 Tab by mouth every eight (8) hours as needed for Nausea. Qty: 10 Tab, Refills: 0      ondansetron hcl (ZOFRAN) 4 mg tablet Take 1 Tab by mouth every eight (8) hours as needed for Nausea. Qty: 20 Tab, Refills: 0      TOPIRAMATE (TOPAMAX PO) Take 50 mg by mouth two (2) times a day. PARoxetine CR (PAXIL-CR) 25 mg tablet Take 1 Tab by mouth daily. Qty: 30 Tab, Refills: 1    Associated Diagnoses: Depression with anxiety           2. Follow-up Information     Follow up With Details Comments [address] [facility], [place] Schedule an appointment as soon as possible for a visit in 3 days As needed [address], [address]  [phone]      [facility] EMERGENCY DEPT Go in 1 day If symptoms worsen [address] [place] [address]  [phone]        Return to ED if worse     Diagnosis     Clinical Impression:   1. Dystonic drug reaction        Attestations: This note is prepared by Cadence Barnhart, acting as Scribe for [employer]. Ed Mckeon MD.    [employer]. Ed Mckeon MD: The scribe's documentation has been prepared under my direction and personally reviewed by me in its entirety. I confirm that the note above accurately reflects all work, treatment, procedures, and medical decision making performed by me.

## 2018-06-02 NOTE — DISCHARGE INSTRUCTIONS
YOU SUFFERED A REACTION TO REGLAN AND HALDOL CALLED A DYSTONIC REACTION. THIS WAS TREATED WITH BENADRYL. PLEASE DO NOT TAKE THESE MEDICATIONS AGAIN, AT LEAST NOT AT THE SAME TIME. Side Effects of Medicine: Care Instructions  Your Care Instructions    Medicines are a big part of treatment for many health problems. But all medicines have side effects. Often these are mild problems. They might include a dry mouth or upset stomach. But sometimes medicines can cause dangerous side effects. One example is a bad allergic reaction. The best treatment will depend on what side effects you have. If you have a serious side effect, you may need to stop taking the medicine. You may also need to take another medicine to treat the side effect. If you have a mild side effect, it may go away after you take the medicine for a while. The doctor has checked you carefully, but problems can develop later. If you notice any problems or new symptoms, get medical treatment right away. Follow-up care is a key part of your treatment and safety. Be sure to make and go to all appointments, and call your doctor if you are having problems. It's also a good idea to know your test results and keep a list of the medicines you take. How can you care for yourself at home? · Be safe with medicines. Take your medicines exactly as prescribed. Call your doctor if you think you are having a problem with your medicine. · Call your doctor if side effects bother you and you wonder if you should keep taking a medicine. Your doctor may be able to lower your dose or change your medicine. Do not suddenly quit taking your medicine unless a doctor tells you to. · Make sure your doctor has a list of all the medicines, vitamins, supplements, and herbal remedies you take. Ask about side effects. When should you call for help?   Watch closely for changes in your health, and be sure to contact your doctor if:  ? · You think you are having a new problem with your medicine. ? · You do not get better as expected. Where can you learn more? Go to http://zhane-francis.info/. Enter D152 in the search box to learn more about \"Side Effects of Medicine: Care Instructions. \"  Current as of: August 14, 2016  Content Version: 11.4  © 3948-0166 [employer]. Care instructions adapted under license by [employer] (which disclaims liability or warranty for this information). If you have questions about a medical condition or this instruction, always ask your healthcare professional. [address] any warranty or liability for your use of this information.

## 2018-06-02 NOTE — ED NOTES
Discharge instructions reviewed with patient. Discharge instructions given to patient per Dr. Mely Lo. Patient able to return/verbalize discharge instructions. Copy of discharge instructions provided. Patient condition stable, respiratory status within normal limits, neuro status intact. Ambulatory out of ER, accompanied by mother.

## 2018-07-02 ENCOUNTER — DOCUMENTATION ONLY (OUTPATIENT)
Dept: CASE MANAGEMENT | Age: 28
End: 2018-07-02

## 2019-10-16 ENCOUNTER — ED HISTORICAL/CONVERTED ENCOUNTER (OUTPATIENT)
Dept: OTHER | Age: 29
End: 2019-10-16

## 2019-11-03 ENCOUNTER — OFFICE VISIT (OUTPATIENT)
Dept: URGENT CARE | Age: 29
End: 2019-11-03

## 2020-01-08 ENCOUNTER — OFFICE VISIT (OUTPATIENT)
Dept: URGENT CARE | Age: 30
End: 2020-01-08

## 2020-01-08 VITALS
TEMPERATURE: 98.8 F | OXYGEN SATURATION: 97 % | RESPIRATION RATE: 18 BRPM | HEIGHT: 67 IN | SYSTOLIC BLOOD PRESSURE: 99 MMHG | WEIGHT: 222 LBS | BODY MASS INDEX: 34.84 KG/M2 | HEART RATE: 89 BPM | DIASTOLIC BLOOD PRESSURE: 58 MMHG

## 2020-01-08 DIAGNOSIS — H65.02 NON-RECURRENT ACUTE SEROUS OTITIS MEDIA OF LEFT EAR: Primary | ICD-10-CM

## 2020-01-08 RX ORDER — DIVALPROEX SODIUM 500 MG/1
TABLET, DELAYED RELEASE ORAL
Refills: 0 | COMMUNITY
Start: 2019-10-11

## 2020-01-08 RX ORDER — DEXTROAMPHETAMINE SACCHARATE, AMPHETAMINE ASPARTATE, DEXTROAMPHETAMINE SULFATE AND AMPHETAMINE SULFATE 7.5; 7.5; 7.5; 7.5 MG/1; MG/1; MG/1; MG/1
30 TABLET ORAL 2 TIMES DAILY
COMMUNITY

## 2020-01-08 RX ORDER — OLANZAPINE 10 MG/1
10 TABLET ORAL
COMMUNITY
End: 2020-01-08

## 2020-01-08 RX ORDER — DOXYCYCLINE 100 MG/1
100 CAPSULE ORAL 2 TIMES DAILY
Qty: 20 CAP | Refills: 0 | Status: SHIPPED | OUTPATIENT
Start: 2020-01-08 | End: 2021-09-10

## 2020-01-08 RX ORDER — VENLAFAXINE HYDROCHLORIDE 150 MG/1
CAPSULE, EXTENDED RELEASE ORAL
Refills: 0 | COMMUNITY
Start: 2019-10-11

## 2020-01-08 RX ORDER — FLUTICASONE PROPIONATE 50 MCG
2 SPRAY, SUSPENSION (ML) NASAL DAILY
Qty: 1 BOTTLE | Refills: 0 | Status: SHIPPED | OUTPATIENT
Start: 2020-01-08 | End: 2021-09-10

## 2020-01-08 NOTE — PROGRESS NOTES
Cold Symptoms   The history is provided by the patient. This is a new problem. The current episode started more than 1 week ago. The problem occurs constantly. The problem has not changed since onset. The cough is non-productive. There has been no fever. Associated symptoms include ear congestion, ear pain and sore throat. He has tried nothing for the symptoms. He is not a smoker. Constipation    This is a chronic problem. The problem has not changed since onset. Associated with: opiod substitute medications. Past Medical History:   Diagnosis Date    Asthma     Depression     Gastrointestinal disorder     abdominal pain    Gastroparesis     GERD (gastroesophageal reflux disease)     Heroin abuse (HCC)     History of IBS     saw Dr. Olla Lombard Ill-defined condition     ADHD    Psychiatric disorder     Depression    Seizures ([facility] [address]     age 7 y/o - had sz x 2 - was seen by Dr. Kat Gonzalez - another sz age 15 y/o, another agoe 15 y/o        Past Surgical History:   Procedure Laterality Date    HX ORTHOPAEDIC      right arm fx.     HX OTHER SURGICAL  6/2/15    ESOPHAGOGASTRODUODENAL (EGD) BIOPSY    UPPER GI ENDOSCOPY,BIOPSY  6/2/2015              Family History   Problem Relation Age of Onset    Anxiety Mother     Anxiety Father     Cancer Maternal Grandfather         bladder     Heart Disease Maternal Grandfather     Diabetes Maternal Grandfather         Social History     Socioeconomic History    Marital status:      Spouse name: Not on file    Number of children: Not on file    Years of education: Not on file    Highest education level: Not on file   Occupational History    Not on file   Social Needs    Financial resource strain: Not on file    Food insecurity:     Worry: Not on file     Inability: Not on file    Transportation needs:     Medical: Not on file     Non-medical: Not on file   Tobacco Use    Smoking status: Current Every Day Smoker     Packs/day: 1.00     Types: Cigarettes    Smokeless tobacco: Never Used   Substance and Sexual Activity    Alcohol use: No     Comment: 6 months     Drug use: No     Types: Marijuana, Heroin     Comment: States he no longer uses    Sexual activity: Yes     Partners: Female     Birth control/protection: None     Comment:     Lifestyle    Physical activity:     Days per week: Not on file     Minutes per session: Not on file    Stress: Not on file   Relationships    Social connections:     Talks on phone: Not on file     Gets together: Not on file     Attends Zoroastrian service: Not on file     Active member of club or organization: Not on file     Attends meetings of clubs or organizations: Not on file     Relationship status: Not on file    Intimate partner violence:     Fear of current or ex partner: Not on file     Emotionally abused: Not on file     Physically abused: Not on file     Forced sexual activity: Not on file   Other Topics Concern    Not on file   Social History Narrative    Not on file                ALLERGIES: Amoxicillin; Ceclor [cefaclor]; Demerol [meperidine]; Levaquin [levofloxacin]; Lorazepam; Morphine; Tramadol; and Zoloft [sertraline]    Review of Systems   HENT: Positive for congestion, ear pain, sore throat and trouble swallowing. Negative for ear discharge. Gastrointestinal: Positive for constipation. All other systems reviewed and are negative. Vitals:    01/08/20 1537   BP: 99/58   Pulse: 89   Resp: 18   Temp: 98.8 °F (37.1 °C)   SpO2: 97%   Weight: 222 lb (100.7 kg)   Height: 5' 7\" (1.702 m)       Physical Exam  Vitals signs and nursing note reviewed. Constitutional:       General: He is not in acute distress. HENT:      Right Ear: Tympanic membrane and ear canal normal.      Left Ear: Ear canal normal. A middle ear effusion is present. Tympanic membrane is erythematous. Nose: Nose normal.      Mouth/Throat:      Pharynx: No oropharyngeal exudate or posterior oropharyngeal erythema. Eyes:      General:         Right eye: No discharge. Left eye: No discharge. Conjunctiva/sclera: Conjunctivae normal.   Neck:      Musculoskeletal: Neck supple. Pulmonary:      Effort: Pulmonary effort is normal. No respiratory distress. Breath sounds: Normal breath sounds. No wheezing or rales. Lymphadenopathy:      Cervical: No cervical adenopathy. Skin:     Findings: No rash. MDM    Procedures        ICD-10-CM ICD-9-CM    1. Non-recurrent acute serous otitis media of left ear H65.02 381.01      Medications Ordered Today   Medications    doxycycline (MONODOX) 100 mg capsule     Sig: Take 1 Cap by mouth two (2) times a day. Dispense:  20 Cap     Refill:  0    fluticasone propionate (FLONASE) 50 mcg/actuation nasal spray     Si Sprays by Both Nostrils route daily. Dispense:  1 Bottle     Refill:  0     No results found for any visits on 20. The patients condition was discussed with the patient and they understand. The patient is to follow up with primary care doctor. If signs and symptoms become worse the pt is to go to the ER. The patient is to take medications as prescribed.

## 2020-01-08 NOTE — PATIENT INSTRUCTIONS
Middle Ear Fluid: Care Instructions  Your Care Instructions    Fluid often builds up inside the ear during a cold or allergies. Usually the fluid drains away, but sometimes a small tube in the ear, called the eustachian tube, stays blocked for months. Symptoms of fluid buildup may include:  · Popping, ringing, or a feeling of fullness or pressure in the ear. · Trouble hearing. · Balance problems and dizziness. In most cases, you can treat yourself at home. Follow-up care is a key part of your treatment and safety. Be sure to make and go to all appointments, and call your doctor if you are having problems. It's also a good idea to know your test results and keep a list of the medicines you take. How can you care for yourself at home? · In most cases, the fluid clears up within a few months without treatment. You may need more tests if the fluid does not clear up after 3 months. · If your doctor prescribed antibiotics, take them as directed. Do not stop taking them just because you feel better. You need to take the full course of antibiotics. When should you call for help? Call your doctor now or seek immediate medical care if:    · You have symptoms of infection, such as:  ? Increased pain, swelling, warmth, or redness. ? Pus draining from the area. ? A fever.    Watch closely for changes in your health, and be sure to contact your doctor if:    · You notice changes in hearing.     · You do not get better as expected. Where can you learn more? Go to http://zhane-francis.info/. Enter V487 in the search box to learn more about \"Middle Ear Fluid: Care Instructions. \"  Current as of: October 21, 2018  Content Version: 12.2  © 0701-4151 [employer]. Care instructions adapted under license by [employer] (which disclaims liability or warranty for this information).  If you have questions about a medical condition or this instruction, always ask your healthcare professional. [address] any warranty or liability for your use of this information.

## 2021-05-13 ENCOUNTER — HOSPITAL ENCOUNTER (EMERGENCY)
Age: 31
Discharge: HOME | End: 2021-05-13
Attending: EMERGENCY MEDICINE
Payer: COMMERCIAL

## 2021-05-13 VITALS
SYSTOLIC BLOOD PRESSURE: 104 MMHG | RESPIRATION RATE: 20 BRPM | OXYGEN SATURATION: 99 % | DIASTOLIC BLOOD PRESSURE: 65 MMHG | HEART RATE: 104 BPM | TEMPERATURE: 97.8 F

## 2021-05-13 DIAGNOSIS — T40.601A NARCOTIC OVERDOSE, ACCIDENTAL OR UNINTENTIONAL, INITIAL ENCOUNTER (HCC): Primary | ICD-10-CM

## 2021-05-13 PROCEDURE — 99284 EMERGENCY DEPT VISIT MOD MDM: CPT
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
## 2021-05-13 NOTE — ED TRIAGE NOTES
Pt arrives with EMS from home after wife found pt unresponsive. Pt is known heroin user. Pt given nasal narcan by wife. On EMS arrival pt alert and able to walk out of house. Pt calm and cooperative for EMS. On arrival to ED pt is yelling at RN and staff for water. Pt refusing to sit or answer questions. Pt denies SI/HI. Denies that he overdosed. Per EMS pt family wanted pt evaluated for mental health.

## 2021-05-13 NOTE — ED NOTES
Pt requesting water. Pt given second bottle of water. Pt continues yelling at Dr. Yesica Linn during provider exam. Dr. Yesica Linn requested pt lower voice. Pt refusing and continues to yell. Pt states \"you're a shitty doctor. You all have been rude to me since I arrived, no one will give me anything to drink. \"
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
## 2021-09-10 ENCOUNTER — APPOINTMENT (OUTPATIENT)
Dept: GENERAL RADIOLOGY | Age: 31
End: 2021-09-10
Attending: PHYSICIAN ASSISTANT
Payer: MEDICAID

## 2021-09-10 ENCOUNTER — HOSPITAL ENCOUNTER (EMERGENCY)
Age: 31
Discharge: HOME | End: 2021-09-10
Attending: EMERGENCY MEDICINE
Payer: MEDICAID

## 2021-09-10 VITALS
DIASTOLIC BLOOD PRESSURE: 65 MMHG | RESPIRATION RATE: 18 BRPM | OXYGEN SATURATION: 100 % | SYSTOLIC BLOOD PRESSURE: 122 MMHG | HEIGHT: 67 IN | TEMPERATURE: 97.7 F | BODY MASS INDEX: 27.47 KG/M2 | WEIGHT: 175 LBS | HEART RATE: 100 BPM

## 2021-09-10 DIAGNOSIS — S93.402A SPRAIN OF LEFT ANKLE, UNSPECIFIED LIGAMENT, INITIAL ENCOUNTER: Primary | ICD-10-CM

## 2021-09-10 PROCEDURE — 99283 EMERGENCY DEPT VISIT LOW MDM: CPT

## 2021-09-10 PROCEDURE — 74011250637 HC RX REV CODE- 250/637: Performed by: PHYSICIAN ASSISTANT

## 2021-09-10 PROCEDURE — 73610 X-RAY EXAM OF ANKLE: CPT

## 2021-09-10 RX ORDER — OLANZAPINE 10 MG/1
10 TABLET ORAL
COMMUNITY

## 2021-09-10 RX ORDER — BUPRENORPHINE AND NALOXONE 8; 2 MG/1; MG/1
1 FILM, SOLUBLE BUCCAL; SUBLINGUAL DAILY
COMMUNITY

## 2021-09-10 RX ORDER — PAROXETINE 30 MG/1
30 TABLET, FILM COATED ORAL DAILY
COMMUNITY

## 2021-09-10 RX ORDER — IBUPROFEN 400 MG/1
800 TABLET ORAL
Status: COMPLETED | OUTPATIENT
Start: 2021-09-10 | End: 2021-09-10

## 2021-09-10 RX ORDER — IBUPROFEN 800 MG/1
800 TABLET ORAL
Qty: 30 TABLET | Refills: 0 | Status: SHIPPED | OUTPATIENT
Start: 2021-09-10 | End: 2021-09-20

## 2021-09-10 RX ORDER — PROPRANOLOL HYDROCHLORIDE 20 MG/1
20 TABLET ORAL 2 TIMES DAILY
COMMUNITY

## 2021-09-10 RX ADMIN — IBUPROFEN 800 MG: 400 TABLET, FILM COATED ORAL at 11:38

## 2021-09-10 NOTE — DISCHARGE INSTRUCTIONS
It was a pleasure taking care of you at [facility] Emergency Department today. We know that when you come to [facility], you are entrusting us with your health, comfort, and safety. Our physicians and nurses honor that trust, and we truly appreciate the opportunity to care for you and your loved ones. We also value your feedback. If you receive a survey about your Emergency Department experience today, please fill it out. We care about our patients' feedback, and we listen to what you have to say. Thank you!

## 2021-09-10 NOTE — LETTER
[address]  [facility] EMERGENCY DEPT  [address] [place], [place] [address]james Leena [id number] [phone]    Work/School Note    Date: 9/10/2021    To Whom It May concern:    Claudia Balderas was seen and treated today in the emergency room by the following provider(s):  Attending Provider: Natalie Oliver MD  Physician Assistant: Owen Dior. Please excuse Claudia Balderas from work today and tomorrow.      Sincerely,          Owen Cardenas

## 2021-09-10 NOTE — ED NOTES
Keara BE reviewed discharge instructions with the patient. The patient verbalized understanding. All questions and concerns were addressed. The patient declined a wheelchair and is discharged ambulatory with crutches in the care of family members with instructions and prescriptions in hand. Pt is alert and oriented x 4. Respirations are clear and unlabored.
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
## 2023-05-12 RX ORDER — VENLAFAXINE HYDROCHLORIDE 150 MG/1
1 CAPSULE, EXTENDED RELEASE ORAL DAILY
COMMUNITY
Start: 2019-10-11

## 2023-05-12 RX ORDER — BUPRENORPHINE AND NALOXONE 8; 2 MG/1; MG/1
1 FILM, SOLUBLE BUCCAL; SUBLINGUAL DAILY
COMMUNITY

## 2023-05-12 RX ORDER — OLANZAPINE 10 MG/1
10 TABLET ORAL NIGHTLY
COMMUNITY

## 2023-05-12 RX ORDER — DEXTROAMPHETAMINE SACCHARATE, AMPHETAMINE ASPARTATE, DEXTROAMPHETAMINE SULFATE AND AMPHETAMINE SULFATE 7.5; 7.5; 7.5; 7.5 MG/1; MG/1; MG/1; MG/1
TABLET ORAL 2 TIMES DAILY
COMMUNITY

## 2023-05-12 RX ORDER — DIVALPROEX SODIUM 500 MG/1
1 TABLET, DELAYED RELEASE ORAL 2 TIMES DAILY
COMMUNITY
Start: 2019-10-11

## 2023-05-12 RX ORDER — PAROXETINE 30 MG/1
30 TABLET, FILM COATED ORAL DAILY
COMMUNITY

## 2023-05-12 RX ORDER — PROPRANOLOL HYDROCHLORIDE 20 MG/1
TABLET ORAL 2 TIMES DAILY
COMMUNITY